# Patient Record
Sex: FEMALE | Race: WHITE | Employment: UNEMPLOYED | URBAN - METROPOLITAN AREA
[De-identification: names, ages, dates, MRNs, and addresses within clinical notes are randomized per-mention and may not be internally consistent; named-entity substitution may affect disease eponyms.]

---

## 2020-07-12 ENCOUNTER — APPOINTMENT (OUTPATIENT)
Dept: GENERAL RADIOLOGY | Age: 40
End: 2020-07-12
Attending: STUDENT IN AN ORGANIZED HEALTH CARE EDUCATION/TRAINING PROGRAM
Payer: MEDICAID

## 2020-07-12 ENCOUNTER — HOSPITAL ENCOUNTER (EMERGENCY)
Age: 40
Discharge: HOME | End: 2020-07-12
Attending: STUDENT IN AN ORGANIZED HEALTH CARE EDUCATION/TRAINING PROGRAM | Admitting: STUDENT IN AN ORGANIZED HEALTH CARE EDUCATION/TRAINING PROGRAM
Payer: MEDICAID

## 2020-07-12 ENCOUNTER — APPOINTMENT (OUTPATIENT)
Dept: CT IMAGING | Age: 40
End: 2020-07-12
Attending: STUDENT IN AN ORGANIZED HEALTH CARE EDUCATION/TRAINING PROGRAM
Payer: MEDICAID

## 2020-07-12 VITALS
DIASTOLIC BLOOD PRESSURE: 88 MMHG | HEART RATE: 99 BPM | SYSTOLIC BLOOD PRESSURE: 136 MMHG | RESPIRATION RATE: 16 BRPM | OXYGEN SATURATION: 97 % | TEMPERATURE: 98.2 F

## 2020-07-12 DIAGNOSIS — S09.90XA CLOSED HEAD INJURY, INITIAL ENCOUNTER: ICD-10-CM

## 2020-07-12 DIAGNOSIS — S49.92XA INJURY OF LEFT UPPER ARM, INITIAL ENCOUNTER: ICD-10-CM
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
## 2020-07-12 DIAGNOSIS — S60.212A CONTUSION OF LEFT WRIST, INITIAL ENCOUNTER: ICD-10-CM

## 2020-07-12 PROCEDURE — 73060 X-RAY EXAM OF HUMERUS: CPT

## 2020-07-12 PROCEDURE — 74011250637 HC RX REV CODE- 250/637: Performed by: STUDENT IN AN ORGANIZED HEALTH CARE EDUCATION/TRAINING PROGRAM

## 2020-07-12 PROCEDURE — 99284 EMERGENCY DEPT VISIT MOD MDM: CPT

## 2020-07-12 PROCEDURE — 99283 EMERGENCY DEPT VISIT LOW MDM: CPT

## 2020-07-12 PROCEDURE — 70450 CT HEAD/BRAIN W/O DYE: CPT

## 2020-07-12 PROCEDURE — 73090 X-RAY EXAM OF FOREARM: CPT

## 2020-07-12 RX ORDER — ACETAMINOPHEN 500 MG
1000 TABLET ORAL
Status: COMPLETED | OUTPATIENT
Start: 2020-07-12 | End: 2020-07-12

## 2020-07-12 RX ADMIN — ACETAMINOPHEN 1000 MG: 500 TABLET ORAL at 04:42
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
## 2020-07-12 NOTE — DISCHARGE INSTRUCTIONS
REACH OUT TO FORENSICS FOR RESOURCES AS NEEDED. DO NOT HESITATE TO RETURN IF NEEDED. RETURN IF WORSENING PAIN, CHANGE IN COLOR, CHANGE IN TEMPERATURE, OR LOSS OF SENSATION IN THE AFFECTED EXTREMITY    Seek immediate care for increased sleepiness, irritability, focal deficits (not moving an arm or leg, face looks different, numbness) and vomiting more than once.

## 2020-07-12 NOTE — FORENSIC NURSE
Patient declined forensics. FNE to complete [facility] referral. SBAR given to [facility], care of patient relinquished to RN.
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
## 2020-11-04 ENCOUNTER — HOSPITAL ENCOUNTER (EMERGENCY)
Age: 40
Discharge: HOME | End: 2020-11-04
Attending: EMERGENCY MEDICINE
Payer: COMMERCIAL

## 2020-11-04 ENCOUNTER — APPOINTMENT (OUTPATIENT)
Dept: GENERAL RADIOLOGY | Age: 40
End: 2020-11-04
Attending: EMERGENCY MEDICINE
Payer: COMMERCIAL

## 2020-11-04 VITALS
HEIGHT: 65 IN | BODY MASS INDEX: 31.65 KG/M2 | HEART RATE: 91 BPM | RESPIRATION RATE: 16 BRPM | DIASTOLIC BLOOD PRESSURE: 87 MMHG | WEIGHT: 190 LBS | SYSTOLIC BLOOD PRESSURE: 121 MMHG | OXYGEN SATURATION: 99 % | TEMPERATURE: 97.4 F
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
## 2020-11-04 DIAGNOSIS — S00.83XA CONTUSION OF FACE, INITIAL ENCOUNTER: ICD-10-CM

## 2020-11-04 PROCEDURE — 73080 X-RAY EXAM OF ELBOW: CPT

## 2020-11-04 PROCEDURE — 74011250637 HC RX REV CODE- 250/637: Performed by: EMERGENCY MEDICINE

## 2020-11-04 PROCEDURE — 75810000275 HC EMERGENCY DEPT VISIT NO LEVEL OF CARE

## 2020-11-04 PROCEDURE — 72072 X-RAY EXAM THORAC SPINE 3VWS: CPT

## 2020-11-04 PROCEDURE — 71046 X-RAY EXAM CHEST 2 VIEWS: CPT

## 2020-11-04 PROCEDURE — 99284 EMERGENCY DEPT VISIT MOD MDM: CPT

## 2020-11-04 PROCEDURE — 70160 X-RAY EXAM OF NASAL BONES: CPT

## 2020-11-04 RX ORDER — HYDROCODONE BITARTRATE AND ACETAMINOPHEN 5; 325 MG/1; MG/1
1 TABLET ORAL
Status: COMPLETED | OUTPATIENT
Start: 2020-11-04 | End: 2020-11-04

## 2020-11-04 RX ORDER — KETOROLAC TROMETHAMINE 10 MG/1
10 TABLET, FILM COATED ORAL
Qty: 20 TAB | Refills: 0 | Status: ON HOLD | OUTPATIENT
Start: 2020-11-04 | End: 2020-12-22

## 2020-11-04 RX ADMIN — HYDROCODONE BITARTRATE AND ACETAMINOPHEN 1 TABLET: 5; 325 TABLET ORAL at 10:56

## 2020-11-04 NOTE — DISCHARGE INSTRUCTIONS
Domestic Abuse: Care Instructions  Your Care Instructions     If you want to save this information but don't think it is safe to take it home, see if a trusted friend can keep it for you. Plan ahead. Know who you can call for help, and memorize the phone number. Be careful online too. Your online activity may be seen by others. Do not use your personal computer or device to read about this topic. Use a safe computer such as one at work, a friend's house, or a [employer]. Domestic abuse is different from an argument now and then. It is a pattern of abuse that one person uses to control another person's behavior. It may start with threats and name-calling. Then, it may lead to more serious acts, like pushing and slapping. The abuse also may occur in other areas. For example, the abuser may withhold money or spend a partner's money without his or her knowledge. Abuse can cause serious harm. You are more likely to have a long-term health problem from the injuries and stress of living in a violent relationship. Women who are sexually abused by their partners have more sexually transmitted diseases and unwanted pregnancies. Men also can be abused in relationships. Anyone who is abused also faces emotional pain. If you are pregnant, abuse can cause problems such as poor weight gain, infections, and bleeding. Abuse during this time may increase your baby's risk of low birth weight, premature birth, and death. Follow-up care is a key part of your treatment and safety. Be sure to make and go to all appointments, and call your doctor if you are having problems. It's also a good idea to know your test results and keep a list of the medicines you take. How can you care for yourself at home? · If you do not have a safe place to stay, discuss this with your doctor before you leave. · Have a plan for where to go, how to leave your house, and where to stay in case of an emergency. Do not tell your partner about your plan. Contact:  ? The [place] Violence Hotline toll-free at 4-218.920.1784. They can help you find resources in your area. ? Your local police department, hospital, or clinic for information about shelters and safe homes near you. · Talk to a trusted friend or neighbor or a Buddhism counselor. Do not feel that you have to hide what happened. · Teach your children how to call for help in an emergency. · Be alert to warning signs, such as threats, heavy alcohol use, or drug use. This can help you avoid danger. · If you can, make sure that there are no guns or other weapons in the house. When should you call for help? Call 911 anytime you think you may need emergency care. For example, call if:    · You or someone else has just been abused.     · You think you or someone else is in danger of being abused. Watch closely for changes in your health, and be sure to contact your doctor if you have any problems. Where can you learn more? Go to http://www.gray.com/  Enter G282 in the search box to learn more about \"Domestic Abuse: Care Instructions. \"  Current as of: December 16, 2019               Content Version: 12.6  © 7973-3851 [employer]. Care instructions adapted under license by [employer] (which disclaims liability or warranty for this information).  If you have questions about a medical condition or this instruction, always ask your healthcare professional. [place] [address] any warranty or liability for your use of this information.
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
## 2020-12-18 ENCOUNTER — APPOINTMENT (OUTPATIENT)
Dept: GENERAL RADIOLOGY | Age: 40
End: 2020-12-18
Attending: EMERGENCY MEDICINE
Payer: COMMERCIAL

## 2020-12-18 ENCOUNTER — HOSPITAL ENCOUNTER (EMERGENCY)
Age: 40
Discharge: HOME | End: 2020-12-18
Attending: EMERGENCY MEDICINE
Payer: COMMERCIAL

## 2020-12-18 VITALS
RESPIRATION RATE: 22 BRPM | SYSTOLIC BLOOD PRESSURE: 153 MMHG | BODY MASS INDEX: 29.38 KG/M2 | OXYGEN SATURATION: 98 % | DIASTOLIC BLOOD PRESSURE: 94 MMHG | TEMPERATURE: 98.9 F | WEIGHT: 176.37 LBS | HEART RATE: 103 BPM | HEIGHT: 65 IN

## 2020-12-18 DIAGNOSIS — R05.8 COUGH WITH EXPOSURE TO COVID-19 VIRUS: ICD-10-CM

## 2020-12-18 DIAGNOSIS — B37.31 YEAST VAGINITIS: Primary | ICD-10-CM

## 2020-12-18 DIAGNOSIS — Z20.822 COUGH WITH EXPOSURE TO COVID-19 VIRUS: ICD-10-CM

## 2020-12-18 LAB
APPEARANCE UR: ABNORMAL
BACTERIA URNS QL MICRO: NEGATIVE /HPF
BILIRUB UR QL: NEGATIVE
COLOR UR: ABNORMAL
EPITH CASTS URNS QL MICRO: ABNORMAL /LPF
GLUCOSE UR STRIP.AUTO-MCNC: NEGATIVE MG/DL
HGB UR QL STRIP: NEGATIVE
KETONES UR QL STRIP.AUTO: 15 MG/DL
LEUKOCYTE ESTERASE UR QL STRIP.AUTO: ABNORMAL
MUCOUS THREADS URNS QL MICRO: ABNORMAL /LPF
NITRITE UR QL STRIP.AUTO: NEGATIVE
PH UR STRIP: 5.5 [PH] (ref 5–8)
PROT UR STRIP-MCNC: NEGATIVE MG/DL
RBC #/AREA URNS HPF: ABNORMAL /HPF (ref 0–5)
SP GR UR REFRACTOMETRY: 1.02 (ref 1–1.03)
UROBILINOGEN UR QL STRIP.AUTO: 0.2 EU/DL (ref 0.2–1)
WBC URNS QL MICRO: ABNORMAL /HPF (ref 0–4)
YEAST URNS QL MICRO: PRESENT

## 2020-12-18 PROCEDURE — 87086 URINE CULTURE/COLONY COUNT: CPT

## 2020-12-18 PROCEDURE — 99284 EMERGENCY DEPT VISIT MOD MDM: CPT

## 2020-12-18 PROCEDURE — 87635 SARS-COV-2 COVID-19 AMP PRB: CPT

## 2020-12-18 PROCEDURE — 81001 URINALYSIS AUTO W/SCOPE: CPT

## 2020-12-18 PROCEDURE — 71045 X-RAY EXAM CHEST 1 VIEW: CPT

## 2020-12-18 PROCEDURE — 74011250637 HC RX REV CODE- 250/637: Performed by: EMERGENCY MEDICINE

## 2020-12-18 RX ORDER — AZITHROMYCIN 250 MG/1
TABLET, FILM COATED ORAL
Qty: 6 TAB | Refills: 0 | Status: SHIPPED | OUTPATIENT
Start: 2020-12-18 | End: 2020-12-28

## 2020-12-18 RX ORDER — BENZONATATE 100 MG/1
100 CAPSULE ORAL
Qty: 30 CAP | Refills: 0 | Status: SHIPPED | OUTPATIENT
Start: 2020-12-18 | End: 2020-12-28

## 2020-12-18 RX ORDER — PREDNISONE 20 MG/1
40 TABLET ORAL DAILY
Qty: 10 TAB | Refills: 0 | Status: SHIPPED | OUTPATIENT
Start: 2020-12-18 | End: 2020-12-28

## 2020-12-18 RX ORDER — FAMOTIDINE 20 MG/1
20 TABLET, FILM COATED ORAL
Status: COMPLETED | OUTPATIENT
Start: 2020-12-18 | End: 2020-12-18

## 2020-12-18 RX ORDER — DEXAMETHASONE SODIUM PHOSPHATE 10 MG/ML
10 INJECTION INTRAMUSCULAR; INTRAVENOUS
Status: COMPLETED | OUTPATIENT
Start: 2020-12-18 | End: 2020-12-18

## 2020-12-18 RX ORDER — CETIRIZINE HCL 10 MG
10 TABLET ORAL
Status: COMPLETED | OUTPATIENT
Start: 2020-12-18 | End: 2020-12-18

## 2020-12-18 RX ORDER — FLUCONAZOLE 100 MG/1
200 TABLET ORAL
Status: COMPLETED | OUTPATIENT
Start: 2020-12-18 | End: 2020-12-18

## 2020-12-18 RX ADMIN — CETIRIZINE HYDROCHLORIDE 10 MG: 10 TABLET, FILM COATED ORAL at 15:42

## 2020-12-18 RX ADMIN — DEXAMETHASONE SODIUM PHOSPHATE 10 MG: 10 INJECTION, SOLUTION INTRAMUSCULAR; INTRAVENOUS at 15:42

## 2020-12-18 RX ADMIN — FAMOTIDINE 20 MG: 20 TABLET ORAL at 15:42

## 2020-12-18 RX ADMIN — FLUCONAZOLE 200 MG: 100 TABLET ORAL at 15:42

## 2020-12-18 NOTE — ED PROVIDER NOTES
HPI patient is a 20-year-old female with past medical history significant for frequent urinary tract infections and multiple ED visits. She states that she is presently homeless and living with her parents. She completed 7 days of Keflex for treatment of urinary tract infection. She states she still has mild urgency and dysuria accompanied by body aches and intermittent fever. She denies any known exposure to Covid. Denies headache, neck pain, visual changes, focal weakness or rash. Denies any difficulty breathing, difficulty swallowing, SOB or chest pain. Denies any nausea, vomiting or diarrhea. Pt. Reports that she has not had any medications today prior to arrival.  She has a prescription for Bentyl which she has using as needed for abdominal pain with some relief noted. History reviewed. No pertinent past medical history. History reviewed. No pertinent surgical history. History reviewed. No pertinent family history.     Social History     Socioeconomic History    Marital status: SINGLE     Spouse name: Not on file    Number of children: Not on file    Years of education: Not on file    Highest education level: Not on file   Occupational History    Not on file   Social Needs    Financial resource strain: Not on file    Food insecurity     Worry: Not on file     Inability: Not on file    Transportation needs     Medical: Not on file     Non-medical: Not on file   Tobacco Use    Smoking status: Current Every Day Smoker    Smokeless tobacco: Never Used   Substance and Sexual Activity    Alcohol use: No    Drug use: Not on file    Sexual activity: Not on file   Lifestyle    Physical activity     Days per week: Not on file     Minutes per session: Not on file    Stress: Not on file   Relationships    Social connections     Talks on phone: Not on file     Gets together: Not on file     Attends Voodoo service: Not on file     Active member of club or organization: Not on file Attends meetings of clubs or organizations: Not on file     Relationship status: Not on file    Intimate partner violence     Fear of current or ex partner: Not on file     Emotionally abused: Not on file     Physically abused: Not on file     Forced sexual activity: Not on file   Other Topics Concern    Not on file   Social History Narrative    Not on file         ALLERGIES: Contrast agent [iodine]    Review of Systems   Constitutional: Positive for fever. Negative for activity change, appetite change and unexpected weight change. HENT: Positive for rhinorrhea and sore throat. Negative for congestion and trouble swallowing. Respiratory: Negative for cough and shortness of breath. Gastrointestinal: Positive for abdominal pain. Negative for constipation, diarrhea, nausea and vomiting. Genitourinary: Positive for dysuria. Negative for difficulty urinating and flank pain. Musculoskeletal: Negative for arthralgias, back pain and myalgias. Neurological: Negative for dizziness, light-headedness and headaches. All other systems reviewed and are negative. Vitals:    12/18/20 1359   BP: (!) 153/94   Pulse: (!) 103   Resp: 22   Temp: 98.9 °F (37.2 °C)   SpO2: 98%   Weight: 80 kg (176 lb 5.9 oz)   Height: 5' 4.5\" (1.638 m)            Physical Exam  Vitals signs and nursing note reviewed. Constitutional:       General: She is not in acute distress. Appearance: Normal appearance. She is normal weight. She is not ill-appearing, toxic-appearing or diaphoretic. Comments: Female; non smoker   HENT:      Head: Normocephalic. Neck:      Musculoskeletal: Normal range of motion and neck supple. Cardiovascular:      Rate and Rhythm: Normal rate and regular rhythm. Pulmonary:      Effort: Pulmonary effort is normal.      Breath sounds: Normal breath sounds. Chest:      Chest wall: No tenderness. Abdominal:      General: Bowel sounds are normal.      Palpations: Abdomen is soft.       Tenderness: There is no abdominal tenderness. There is no guarding or rebound. Hernia: No hernia is present. Musculoskeletal: Normal range of motion. Lymphadenopathy:      Cervical: No cervical adenopathy. Skin:     General: Skin is warm and dry. Findings: No rash. Neurological:      General: No focal deficit present. Mental Status: She is alert and oriented to person, place, and time. Psychiatric:         Mood and Affect: Mood normal.         Behavior: Behavior normal.          MDM       Procedures      Fern Saldana was evaluated in the Emergency Department on 12/18/2020 for the symptoms described in the history of present illness. He/she was evaluated in the context of the global COVID-19 pandemic, which necessitated consideration that the patient might be at risk for infection with the SARS-CoV-2 virus that causes COVID-19. Institutional protocols and algorithms that pertain to the evaluation of patients at risk for COVID-19 are in a state of rapid change based on information released by regulatory bodies including the CDC and federal and state organizations. These policies and algorithms were followed during the patient's care in the ED. Surrogate Decision Maker (Who do you want to make decisions for you in the event you are not able to?): Extended Emergency Contact Information  Primary Emergency Contact: Oneida Lancaster  Mobile Phone: [phone]  Relation: Other Non-relative    Ventilation (Do you want to be intubated and mechanically ventilated?):  Yes    CPR (Do you want chest compressions and electricity in an attempt to restart your heart?): Yes      Xr Chest Port    Result Date: 12/18/2020  IMPRESSION: No acute process.      Labs Reviewed   URINALYSIS W/ RFLX MICROSCOPIC - Abnormal; Notable for the following components:       Result Value    Appearance CLOUDY (*)     Ketone 15 (*)     Leukocyte Esterase SMALL (*)     Epithelial cells MODERATE (*)     Mucus 3+ (*)     Yeast w/hyphae PRESENT (*)     All other components within normal limits   CULTURE, URINE   SARS-COV-2   *UA&MICRO CHARGE BAT     Patient was treated with Diflucan in the ED for yeast vaginitis. Recommend close follow-up with OB/GYN for further evaluation and treatment. Reviewed quarantine recommendations with the patient and supportive care. Plan to discharge on Z-Rashi, 5 days of prednisone and Tessalon Perles. Recommend close follow-up with PCP if symptoms persist.    4:27 PM  Patient's results and plan of care have been reviewed with her. Patient has verbally conveyed her understanding and agreement of her signs, symptoms, diagnosis, treatment and prognosis and additionally agrees to follow up as recommended or return to the Emergency Room should her condition change prior to follow-up. Discharge instructions have also been provided to the patient with some educational information regarding her diagnosis as well a list of reasons why she would want to return to the ER prior to her follow-up appointment should her condition change. Nicky Elam NP

## 2020-12-18 NOTE — ED NOTES
Triage Note: Patient is coming in for abdominal pain and recent UTI that she has finished antibiotic. Patient is scheduled for D&C for 1/7/2021. In the last 24 hours patient has started with body aches and fever.

## 2020-12-18 NOTE — DISCHARGE INSTRUCTIONS
Patient Education   Learning About Coronavirus [phone])  Coronavirus [phone]): Overview  What is coronavirus ([id number]-56)? The coronavirus disease (COVID-19) is caused by a virus. It is an illness that was first found in [place], [place], in December 2019. It has since spread worldwide. The virus can cause fever, cough, and trouble breathing. In severe cases, it can cause pneumonia and make it hard to breathe without help. It can cause death. Coronaviruses are a large group of viruses. They cause the common cold. They also cause more serious illnesses like Middle East respiratory syndrome (MERS) and severe acute respiratory syndrome (SARS). COVID-19 is caused by a novel coronavirus. That means it's a new type that has not been seen in people before. This virus spreads person-to-person through droplets from coughing and sneezing. It can also spread when you are close to someone who is infected. And it can spread when you touch something that has the virus on it, such as a doorknob or a tabletop. What can you do to protect yourself from coronavirus (COVID-19)? The best way to protect yourself from getting sick is to:  · Avoid areas where there is an outbreak. · Avoid contact with people who may be infected. · Wash your hands often with soap or alcohol-based hand sanitizers. · Avoid crowds and try to stay at least 6 feet away from other people. · Wash your hands often, especially after you cough or sneeze. Use soap and water, and scrub for at least 20 seconds. If soap and water aren't available, use an alcohol-based hand . · Avoid touching your mouth, nose, and eyes. What can you do to avoid spreading the virus to others? To help avoid spreading the virus to others:  · Cover your mouth with a tissue when you cough or sneeze. Then throw the tissue in the trash. · Use a disinfectant to clean things that you touch often. · Stay home if you are sick or have been exposed to the virus.  Don't go to school, work, or public areas. And don't use public transportation. · If you are sick:  ? Leave your home only if you need to get medical care. But call the doctor's office first so they know you're coming. And wear a face mask, if you have one.  ? If you have a face mask, wear it whenever you're around other people. It can help stop the spread of the virus when you cough or sneeze. ? Clean and disinfect your home every day. Use household  and disinfectant wipes or sprays. Take special care to clean things that you grab with your hands. These include doorknobs, remote controls, phones, and handles on your refrigerator and microwave. And don't forget countertops, tabletops, bathrooms, and computer keyboards. When to call for help  Call 911 anytime you think you may need emergency care. For example, call if:  · You have severe trouble breathing. (You can't talk at all.)  · You have constant chest pain or pressure. · You are severely dizzy or lightheaded. · You are confused or can't think clearly. · Your face and lips have a blue color. · You pass out (lose consciousness) or are very hard to wake up. Call your doctor now if you develop symptoms such as:  · Shortness of breath. · Fever. · Cough. If you need to get care, call ahead to the doctor's office for instructions before you go. Make sure you wear a face mask, if you have one, to prevent exposing other people to the virus. Where can you get the latest information? The following health organizations are tracking and studying this virus. Their websites contain the most up-to-date information. [place] Lopez also learn what to do if you think you may have been exposed to the virus. · U.S. Centers for Disease Control and Prevention (CDC): The CDC provides updated news about the disease and travel advice. The website also tells you how to prevent the spread of infection.  www.cdc.gov  · World Health Organization [facility]): WHO offers information about the virus outbreaks. WHO also has travel advice. www.who.int  Current as of: April 1, 2020               Content Version: 12.4  © 2006-2020 Healthwise, Incorporated. Care instructions adapted under license by your healthcare professional. If you have questions about a medical condition or this instruction, always ask your healthcare professional. [address] any warranty or liability for your use of this information. Patient Education        Cough: Care Instructions  Your Care Instructions     A cough is your body's response to something that bothers your throat or airways. Many things can cause a cough. You might cough because of a cold or the flu, bronchitis, or asthma. Smoking, postnasal drip, allergies, and stomach acid that backs up into your throat also can cause coughs. A cough is a symptom, not a disease. Most coughs stop when the cause, such as a cold, goes away. You can take a few steps at home to cough less and feel better. Follow-up care is a key part of your treatment and safety. Be sure to make and go to all appointments, and call your doctor if you are having problems. It's also a good idea to know your test results and keep a list of the medicines you take. How can you care for yourself at home? · Drink lots of water and other fluids. This helps thin the mucus and soothes a dry or sore throat. Honey or lemon juice in hot water or tea may ease a dry cough. · Take cough medicine as directed by your doctor. · Prop up your head on pillows to help you breathe and ease a dry cough. · Try cough drops to soothe a dry or sore throat. Cough drops don't stop a cough. Medicine-flavored cough drops are no better than candy-flavored drops or hard candy. · Do not smoke. Avoid secondhand smoke. If you need help quitting, talk to your doctor about stop-smoking programs and medicines. These can increase your chances of quitting for good. When should you call for help?    Call [phone] anytime you think you may need emergency care. For example, call if:    · You have severe trouble breathing. Call your doctor now or seek immediate medical care if:    · You cough up blood.     · You have new or worse trouble breathing.     · You have a new or higher fever.     · You have a new rash. Watch closely for changes in your health, and be sure to contact your doctor if:    · You cough more deeply or more often, especially if you notice more mucus or a change in the color of your mucus.     · You have new symptoms, such as a sore throat, an earache, or sinus pain.     · You do not get better as expected. Where can you learn more? Go to http://www.gray.com/  Enter D279 in the search box to learn more about \"Cough: Care Instructions. \"  Current as of: February 24, 2020               Content Version: 12.6  © 0035-9520 [employer]. Care instructions adapted under license by [employer] (which disclaims liability or warranty for this information). If you have questions about a medical condition or this instruction, always ask your healthcare professional. [address] any warranty or liability for your use of this information. Patient Education        Vaginal Yeast Infection: Care Instructions  Your Care Instructions     A vaginal yeast infection is caused by too many yeast cells in the vagina. This is common in women of all ages. Itching, vaginal discharge and irritation, and other symptoms can bother you. But yeast infections don't often cause other health problems. Some medicines can increase your risk of getting a yeast infection. These include antibiotics, birth control pills, hormones, and steroids. You may also be more likely to get a yeast infection if you are pregnant, have diabetes, douche, or wear tight clothes. With treatment, most yeast infections get better in 2 to 3 days.   Follow-up care is a key part of your treatment and safety. Be sure to make and go to all appointments, and call your doctor if you are having problems. It's also a good idea to know your test results and keep a list of the medicines you take. How can you care for yourself at home? · Take your medicines exactly as prescribed. Call your doctor if you think you are having a problem with your medicine. · Ask your doctor about over-the-counter (OTC) medicines for yeast infections. They may cost less than prescription medicines. If you use an OTC treatment, read and follow all instructions on the label. · Do not use tampons while using a vaginal cream or suppository. The tampons can absorb the medicine. Use pads instead. · Wear loose cotton clothing. Do not wear nylon or other fabric that holds body heat and moisture close to the skin. · Try sleeping without underwear. · Do not scratch. Relieve itching with a cold pack or a cool bath. · Do not wash your vaginal area more than once a day. Use plain water or a mild, unscented soap. Air-dry the vaginal area. · Change out of wet swimsuits after swimming. · Do not have sex until you have finished your treatment. · Do not douche. When should you call for help? Call your doctor now or seek immediate medical care if:    · You have unexpected vaginal bleeding.     · You have new or increased pain in your vagina or pelvis. Watch closely for changes in your health, and be sure to contact your doctor if:    · You have a fever.     · You are not getting better after 2 days.     · Your symptoms come back after you finish your medicines. Where can you learn more? Go to http://www.gray.com/  Enter K587 in the search box to learn more about \"Vaginal Yeast Infection: Care Instructions. \"  Current as of: November 8, 2019               Content Version: 12.6  © 4002-0386 [employer].    Care instructions adapted under license by [employer] (which disclaims liability or warranty for this information). If you have questions about a medical condition or this instruction, always ask your healthcare professional. [address] any warranty or liability for your use of this information. Patient Education        Viral Infections: Care Instructions  Your Care Instructions     You don't feel well, but it's not clear what's causing it. You may have a viral infection. Viruses cause many illnesses, such as the common cold, influenza, fever, rashes, and the diarrhea, nausea, and vomiting that are often called \"stomach flu. \" You may wonder if antibiotic medicines could make you feel better. But antibiotics only treat infections caused by bacteria. They don't work on viruses. The good news is that viral infections usually aren't serious. Most will go away in a few days without medical treatment. In the meantime, there are a few things you can do to make yourself more comfortable. Follow-up care is a key part of your treatment and safety. Be sure to make and go to all appointments, and call your doctor if you are having problems. It's also a good idea to know your test results and keep a list of the medicines you take. How can you care for yourself at home? · Get plenty of rest if you feel tired. · Take an over-the-counter pain medicine if needed, such as acetaminophen (Tylenol), ibuprofen (Advil, Motrin), or naproxen (Aleve). Read and follow all instructions on the label. · Be careful when taking over-the-counter cold or flu medicines and Tylenol at the same time. Many of these medicines have acetaminophen, which is Tylenol. Read the labels to make sure that you are not taking more than the recommended dose. Too much acetaminophen (Tylenol) can be harmful. · Drink plenty of fluids, enough so that your urine is light yellow or clear like water.  If you have kidney, heart, or liver disease and have to limit fluids, talk with your doctor before you increase the amount of fluids you drink. · Stay home from work, school, and other public places while you have a fever. When should you call for help? Call 911 anytime you think you may need emergency care. For example, call if:    · You have severe trouble breathing.     · You passed out (lost consciousness). Call your doctor now or seek immediate medical care if:    · You seem to be getting much sicker.     · You have a new or higher fever.     · You have blood in your stools.     · You have new belly pain, or your pain gets worse.     · You have a new rash. Watch closely for changes in your health, and be sure to contact your doctor if:    · You start to get better and then get worse.     · You do not get better as expected. Where can you learn more? Go to http://www.gray.com/  Enter L906 in the search box to learn more about \"Viral Infections: Care Instructions. \"  Current as of: February 11, 2020               Content Version: 12.6  © 2006-2020 [employer]. Care instructions adapted under license by [employer] (which disclaims liability or warranty for this information). If you have questions about a medical condition or this instruction, always ask your healthcare professional. [address] any warranty or liability for your use of this information.

## 2020-12-19 LAB
BACTERIA SPEC CULT: NORMAL
CC UR VC: NORMAL
SERVICE CMNT-IMP: NORMAL

## 2020-12-21 ENCOUNTER — HOSPITAL ENCOUNTER (EMERGENCY)
Age: 40
Discharge: HOME | DRG: 754 | End: 2020-12-21
Attending: EMERGENCY MEDICINE
Payer: COMMERCIAL

## 2020-12-21 ENCOUNTER — HOSPITAL ENCOUNTER (INPATIENT)
Age: 40
LOS: 6 days | Discharge: HOME | DRG: 754 | End: 2020-12-28
Attending: EMERGENCY MEDICINE | Admitting: PSYCHIATRY & NEUROLOGY
Payer: COMMERCIAL

## 2020-12-21 ENCOUNTER — APPOINTMENT (OUTPATIENT)
Dept: CT IMAGING | Age: 40
DRG: 754 | End: 2020-12-21
Attending: EMERGENCY MEDICINE
Payer: COMMERCIAL

## 2020-12-21 VITALS
HEART RATE: 77 BPM | BODY MASS INDEX: 28.25 KG/M2 | WEIGHT: 169.53 LBS | TEMPERATURE: 99.2 F | DIASTOLIC BLOOD PRESSURE: 86 MMHG | OXYGEN SATURATION: 99 % | HEIGHT: 65 IN | SYSTOLIC BLOOD PRESSURE: 118 MMHG | RESPIRATION RATE: 16 BRPM

## 2020-12-21 DIAGNOSIS — N32.89 BLADDER SPASM: ICD-10-CM

## 2020-12-21 DIAGNOSIS — R10.84 ABDOMINAL PAIN, GENERALIZED: Primary | ICD-10-CM

## 2020-12-21 DIAGNOSIS — F29 PSYCHOSIS, UNSPECIFIED PSYCHOSIS TYPE (HCC): Primary | ICD-10-CM

## 2020-12-21 LAB
ALBUMIN SERPL-MCNC: 4.2 G/DL (ref 3.5–5)
ALBUMIN SERPL-MCNC: 5 G/DL (ref 3.5–5)
ALBUMIN/GLOB SERPL: 1.1 {RATIO} (ref 1.1–2.2)
ALBUMIN/GLOB SERPL: 1.4 {RATIO} (ref 1.1–2.2)
ALP SERPL-CCNC: 33 U/L (ref 45–117)
ALP SERPL-CCNC: 36 U/L (ref 45–117)
ALT SERPL-CCNC: 13 U/L (ref 12–78)
ALT SERPL-CCNC: 15 U/L (ref 12–78)
AMPHET UR QL SCN: POSITIVE
ANION GAP SERPL CALC-SCNC: 11 MMOL/L (ref 5–15)
ANION GAP SERPL CALC-SCNC: 13 MMOL/L (ref 5–15)
APAP SERPL-MCNC: <2 UG/ML (ref 10–30)
APPEARANCE UR: ABNORMAL
AST SERPL-CCNC: 12 U/L (ref 15–37)
AST SERPL-CCNC: 13 U/L (ref 15–37)
BACTERIA URNS QL MICRO: ABNORMAL /HPF
BARBITURATES UR QL SCN: NEGATIVE
BASOPHILS # BLD: 0.1 K/UL (ref 0–0.1)
BASOPHILS # BLD: 0.1 K/UL (ref 0–0.1)
BASOPHILS NFR BLD: 0 % (ref 0–1)
BASOPHILS NFR BLD: 1 % (ref 0–1)
BENZODIAZ UR QL: NEGATIVE
BILIRUB DIRECT SERPL-MCNC: 0.2 MG/DL (ref 0–0.2)
BILIRUB SERPL-MCNC: 0.8 MG/DL (ref 0.2–1)
BILIRUB SERPL-MCNC: 0.8 MG/DL (ref 0.2–1)
BILIRUB UR QL: NEGATIVE
BUN SERPL-MCNC: 13 MG/DL (ref 6–20)
BUN SERPL-MCNC: 15 MG/DL (ref 6–20)
BUN/CREAT SERPL: 15 (ref 12–20)
BUN/CREAT SERPL: 21 (ref 12–20)
CALCIUM SERPL-MCNC: 9.5 MG/DL (ref 8.5–10.1)
CALCIUM SERPL-MCNC: 9.7 MG/DL (ref 8.5–10.1)
CANNABINOIDS UR QL SCN: POSITIVE
CHLORIDE SERPL-SCNC: 101 MMOL/L (ref 97–108)
CHLORIDE SERPL-SCNC: 99 MMOL/L (ref 97–108)
CO2 SERPL-SCNC: 25 MMOL/L (ref 21–32)
CO2 SERPL-SCNC: 27 MMOL/L (ref 21–32)
COCAINE UR QL SCN: NEGATIVE
COLOR UR: ABNORMAL
COMMENT, HOLDF: NORMAL
COVID-19, XGCOVT: NOT DETECTED
CREAT SERPL-MCNC: 0.7 MG/DL (ref 0.55–1.02)
CREAT SERPL-MCNC: 0.85 MG/DL (ref 0.55–1.02)
DIFFERENTIAL METHOD BLD: ABNORMAL
DIFFERENTIAL METHOD BLD: ABNORMAL
DRUG SCRN COMMENT,DRGCM: ABNORMAL
EOSINOPHIL # BLD: 0.2 K/UL (ref 0–0.4)
EOSINOPHIL # BLD: 0.2 K/UL (ref 0–0.4)
EOSINOPHIL NFR BLD: 1 % (ref 0–7)
EOSINOPHIL NFR BLD: 2 % (ref 0–7)
EPITH CASTS URNS QL MICRO: ABNORMAL /LPF
ERYTHROCYTE [DISTWIDTH] IN BLOOD BY AUTOMATED COUNT: 13.4 % (ref 11.5–14.5)
ERYTHROCYTE [DISTWIDTH] IN BLOOD BY AUTOMATED COUNT: 13.7 % (ref 11.5–14.5)
ETHANOL SERPL-MCNC: <10 MG/DL
ETHANOL SERPL-MCNC: <10 MG/DL
GLOBULIN SER CALC-MCNC: 3.6 G/DL (ref 2–4)
GLOBULIN SER CALC-MCNC: 3.7 G/DL (ref 2–4)
GLUCOSE SERPL-MCNC: 83 MG/DL (ref 65–100)
GLUCOSE SERPL-MCNC: 96 MG/DL (ref 65–100)
GLUCOSE UR STRIP.AUTO-MCNC: NEGATIVE MG/DL
HCG UR QL: NEGATIVE
HCT VFR BLD AUTO: 45.5 % (ref 35–47)
HCT VFR BLD AUTO: 47.7 % (ref 35–47)
HEALTH STATUS, XMCV2T: NORMAL
HGB BLD-MCNC: 14.9 G/DL (ref 11.5–16)
HGB BLD-MCNC: 15.4 G/DL (ref 11.5–16)
HGB UR QL STRIP: ABNORMAL
IMM GRANULOCYTES # BLD AUTO: 0 K/UL (ref 0–0.04)
IMM GRANULOCYTES # BLD AUTO: 0.1 K/UL (ref 0–0.04)
IMM GRANULOCYTES NFR BLD AUTO: 0 % (ref 0–0.5)
IMM GRANULOCYTES NFR BLD AUTO: 0 % (ref 0–0.5)
KETONES UR QL STRIP.AUTO: NEGATIVE MG/DL
LEUKOCYTE ESTERASE UR QL STRIP.AUTO: ABNORMAL
LIPASE SERPL-CCNC: 191 U/L (ref 73–393)
LYMPHOCYTES # BLD: 5.2 K/UL (ref 0.8–3.5)
LYMPHOCYTES # BLD: 5.2 K/UL (ref 0.8–3.5)
LYMPHOCYTES NFR BLD: 38 % (ref 12–49)
LYMPHOCYTES NFR BLD: 39 % (ref 12–49)
MCH RBC QN AUTO: 28.9 PG (ref 26–34)
MCH RBC QN AUTO: 29 PG (ref 26–34)
MCHC RBC AUTO-ENTMCNC: 32.3 G/DL (ref 30–36.5)
MCHC RBC AUTO-ENTMCNC: 32.7 G/DL (ref 30–36.5)
MCV RBC AUTO: 88.7 FL (ref 80–99)
MCV RBC AUTO: 89.7 FL (ref 80–99)
METHADONE UR QL: NEGATIVE
MONOCYTES # BLD: 1.3 K/UL (ref 0–1)
MONOCYTES # BLD: 1.5 K/UL (ref 0–1)
MONOCYTES NFR BLD: 10 % (ref 5–13)
MONOCYTES NFR BLD: 11 % (ref 5–13)
NEUTS SEG # BLD: 6.2 K/UL (ref 1.8–8)
NEUTS SEG # BLD: 6.9 K/UL (ref 1.8–8)
NEUTS SEG NFR BLD: 47 % (ref 32–75)
NEUTS SEG NFR BLD: 51 % (ref 32–75)
NITRITE UR QL STRIP.AUTO: NEGATIVE
NRBC # BLD: 0 K/UL (ref 0–0.01)
NRBC # BLD: 0 K/UL (ref 0–0.01)
NRBC BLD-RTO: 0 PER 100 WBC
NRBC BLD-RTO: 0 PER 100 WBC
OPIATES UR QL: NEGATIVE
PCP UR QL: NEGATIVE
PH UR STRIP: 6 [PH] (ref 5–8)
PLATELET # BLD AUTO: 387 K/UL (ref 150–400)
PLATELET # BLD AUTO: 414 K/UL (ref 150–400)
PMV BLD AUTO: 9.4 FL (ref 8.9–12.9)
PMV BLD AUTO: 9.7 FL (ref 8.9–12.9)
POTASSIUM SERPL-SCNC: 3.3 MMOL/L (ref 3.5–5.1)
POTASSIUM SERPL-SCNC: 3.9 MMOL/L (ref 3.5–5.1)
PROT SERPL-MCNC: 7.9 G/DL (ref 6.4–8.2)
PROT SERPL-MCNC: 8.6 G/DL (ref 6.4–8.2)
PROT UR STRIP-MCNC: NEGATIVE MG/DL
RBC # BLD AUTO: 5.13 M/UL (ref 3.8–5.2)
RBC # BLD AUTO: 5.32 M/UL (ref 3.8–5.2)
RBC #/AREA URNS HPF: ABNORMAL /HPF (ref 0–5)
SALICYLATES SERPL-MCNC: 4.2 MG/DL (ref 2.8–20)
SAMPLES BEING HELD,HOLD: NORMAL
SODIUM SERPL-SCNC: 137 MMOL/L (ref 136–145)
SODIUM SERPL-SCNC: 139 MMOL/L (ref 136–145)
SOURCE, COVRS: NORMAL
SP GR UR REFRACTOMETRY: 1.01 (ref 1–1.03)
SPECIMEN SOURCE, FCOV2M: NORMAL
SPECIMEN TYPE, XMCV1T: NORMAL
TROPONIN I SERPL-MCNC: <0.05 NG/ML
UR CULT HOLD, URHOLD: NORMAL
UROBILINOGEN UR QL STRIP.AUTO: 0.2 EU/DL (ref 0.2–1)
WBC # BLD AUTO: 13.3 K/UL (ref 3.6–11)
WBC # BLD AUTO: 13.7 K/UL (ref 3.6–11)
WBC URNS QL MICRO: ABNORMAL /HPF (ref 0–4)

## 2020-12-21 PROCEDURE — 81001 URINALYSIS AUTO W/SCOPE: CPT

## 2020-12-21 PROCEDURE — 81025 URINE PREGNANCY TEST: CPT

## 2020-12-21 PROCEDURE — 99285 EMERGENCY DEPT VISIT HI MDM: CPT

## 2020-12-21 PROCEDURE — 36415 COLL VENOUS BLD VENIPUNCTURE: CPT

## 2020-12-21 PROCEDURE — 80076 HEPATIC FUNCTION PANEL: CPT

## 2020-12-21 PROCEDURE — 85025 COMPLETE CBC W/AUTO DIFF WBC: CPT

## 2020-12-21 PROCEDURE — 84484 ASSAY OF TROPONIN QUANT: CPT

## 2020-12-21 PROCEDURE — 83690 ASSAY OF LIPASE: CPT

## 2020-12-21 PROCEDURE — 74176 CT ABD & PELVIS W/O CONTRAST: CPT

## 2020-12-21 PROCEDURE — 80307 DRUG TEST PRSMV CHEM ANLYZR: CPT

## 2020-12-21 PROCEDURE — 74011250637 HC RX REV CODE- 250/637: Performed by: EMERGENCY MEDICINE

## 2020-12-21 PROCEDURE — 80053 COMPREHEN METABOLIC PANEL: CPT

## 2020-12-21 RX ORDER — LORAZEPAM 1 MG/1
1 TABLET ORAL
COMMUNITY
End: 2020-12-28

## 2020-12-21 RX ORDER — PHENAZOPYRIDINE HYDROCHLORIDE 200 MG/1
200 TABLET, FILM COATED ORAL 3 TIMES DAILY
Qty: 6 TAB | Refills: 0 | Status: SHIPPED | OUTPATIENT
Start: 2020-12-21 | End: 2020-12-28

## 2020-12-21 RX ORDER — DICYCLOMINE HYDROCHLORIDE 10 MG/1
20 CAPSULE ORAL
Status: COMPLETED | OUTPATIENT
Start: 2020-12-21 | End: 2020-12-21

## 2020-12-21 RX ADMIN — DICYCLOMINE HYDROCHLORIDE 20 MG: 10 CAPSULE ORAL at 16:27

## 2020-12-21 NOTE — ED TRIAGE NOTES
TRIAGE NOTE: Patient arrived from home with c/o generalized abdominal pain for the past week. Patient was seen at [facility] a couple days ago. Patient appears to be drowsy in triage.

## 2020-12-21 NOTE — ED PROVIDER NOTES
The history is provided by the patient. No  was used. Abdominal Pain   This is a recurrent problem. The current episode started more than 1 week ago. The problem occurs daily. The problem has not changed since onset. The pain is located in the generalized abdominal region. The quality of the pain is dull and cramping. The pain is moderate. Associated symptoms include anorexia. Pertinent negatives include no fever, no belching, no diarrhea, no flatus, no hematochezia, no melena, no nausea, no vomiting, no constipation, no dysuria, no frequency, no hematuria, no headaches, no arthralgias, no myalgias, no trauma, no chest pain and no back pain. Nothing worsens the pain. The pain is relieved by nothing. Past workup includes CT scan. Past workup includes no surgery, no colonoscopy. Her past medical history is significant for UTI. The patient's surgical history non-contributory. History reviewed. No pertinent past medical history. History reviewed. No pertinent surgical history. History reviewed. No pertinent family history.     Social History     Socioeconomic History    Marital status: SINGLE     Spouse name: Not on file    Number of children: Not on file    Years of education: Not on file    Highest education level: Not on file   Occupational History    Not on file   Social Needs    Financial resource strain: Not on file    Food insecurity     Worry: Not on file     Inability: Not on file    Transportation needs     Medical: Not on file     Non-medical: Not on file   Tobacco Use    Smoking status: Current Every Day Smoker    Smokeless tobacco: Never Used   Substance and Sexual Activity    Alcohol use: No    Drug use: Yes     Types: Marijuana    Sexual activity: Not on file   Lifestyle    Physical activity     Days per week: Not on file     Minutes per session: Not on file    Stress: Not on file   Relationships    Social connections     Talks on phone: Not on file Gets together: Not on file     Attends [employer] service: Not on file     Active member of club or organization: Not on file     Attends meetings of clubs or organizations: Not on file     Relationship status: Not on file    Intimate partner violence     Fear of current or ex partner: Not on file     Emotionally abused: Not on file     Physically abused: Not on file     Forced sexual activity: Not on file   Other Topics Concern    Not on file   Social History Narrative    Not on file         ALLERGIES: Contrast agent [iodine]    Review of Systems   Constitutional: Negative for activity change, chills and fever. HENT: Negative for nosebleeds, sore throat, trouble swallowing and voice change. Eyes: Negative for visual disturbance. Respiratory: Negative for shortness of breath. Cardiovascular: Negative for chest pain and palpitations. Gastrointestinal: Positive for abdominal pain and anorexia. Negative for constipation, diarrhea, flatus, hematochezia, melena, nausea and vomiting. Genitourinary: Negative for difficulty urinating, dysuria, frequency, hematuria and urgency. Musculoskeletal: Negative for arthralgias, back pain, myalgias, neck pain and neck stiffness. Skin: Negative for color change. Allergic/Immunologic: Negative for immunocompromised state. Neurological: Negative for dizziness, seizures, syncope, weakness, light-headedness, numbness and headaches. Psychiatric/Behavioral: Negative for behavioral problems, confusion, hallucinations, self-injury and suicidal ideas. Vitals:    12/21/20 1606 12/21/20 1608   BP:  119/86   Pulse:  77   Resp:  16   Temp:  99.2 °F (37.3 °C)   SpO2:  99%   Weight:  76.9 kg (169 lb 8.5 oz)   Height: 5' 4.5\" (1.638 m) 5' 4.5\" (1.638 m)            Physical Exam  Vitals signs and nursing note reviewed. Constitutional:       General: She is not in acute distress. Appearance: She is well-developed. She is not diaphoretic.    HENT:      Head: Normocephalic and atraumatic. Eyes:      Pupils: Pupils are equal, round, and reactive to light. Neck:      Musculoskeletal: Normal range of motion and neck supple. Cardiovascular:      Rate and Rhythm: Normal rate and regular rhythm. Heart sounds: Normal heart sounds. No murmur. No friction rub. No gallop. Pulmonary:      Effort: Pulmonary effort is normal. No respiratory distress. Breath sounds: Normal breath sounds. No wheezing. Abdominal:      General: Bowel sounds are normal. There is no distension. Palpations: Abdomen is soft. Tenderness: There is generalized abdominal tenderness. There is no guarding or rebound. Musculoskeletal: Normal range of motion. Skin:     General: Skin is warm. Findings: No rash. Neurological:      Mental Status: She is alert and oriented to person, place, and time. Psychiatric:         Behavior: Behavior normal.         Thought Content: Thought content normal.         Judgment: Judgment normal.          MDM     This is a 28-year-old female with past medical history, review of systems, physical exam as above, presenting with complaints of abdominal pain. Patient states she has had several weeks of suprapubic abdominal pain, treated by primary care for urinary tract infection and then recently at another emergency department for yeast infection. She denies fevers or chills, states she has been cutting back on NSAIDs for pain control. She states her last dose of antibiotics this morning she is unclear as to what antibiotic she is taking. She denies a history of abdominal surgeries, or colonoscopy. She was transported by EMS as she states her car broke down on the way back to the hospital.  Physical exam remarkable for well-appearing young female, in no acute distress with diffuse mild abdominal tenderness without rebound or guarding, clear breath sounds, regular rate and rhythm without murmurs gallops or rubs.   She is noted to be afebrile, normotensive without tachycardia. Differential includes gastroenteritis, urinary tract infection, pyelonephritis. Plan to offer Bentyl, obtain CMP, CBC, UA, lipase, UPT, CT scan of the abdomen and pelvis. We will reassess, and make a disposition. Procedures    5:22 PM  CT scan reassuring, physical exam nonspecific, lab work with mildly elevated white count and possibly clearing urinary tract infection. Discussed with patient possible bladder spasms, no interstitial cystitis or other acute process on CT. Discussed Pyridium, primary care follow-up, return precautions given.

## 2020-12-22 PROBLEM — Z72.0 TOBACCO USE: Status: ACTIVE | Noted: 2020-12-22

## 2020-12-22 PROBLEM — F43.10 PTSD (POST-TRAUMATIC STRESS DISORDER): Status: ACTIVE | Noted: 2020-12-22

## 2020-12-22 PROBLEM — N93.9 ABNORMAL UTERINE BLEEDING: Status: ACTIVE | Noted: 2020-12-22

## 2020-12-22 PROBLEM — F41.9 ANXIETY: Status: ACTIVE | Noted: 2020-12-22

## 2020-12-22 PROBLEM — F32.9 MDD (MAJOR DEPRESSIVE DISORDER): Status: ACTIVE | Noted: 2020-12-22

## 2020-12-22 LAB
AMPHET UR QL SCN: POSITIVE
APPEARANCE UR: CLEAR
BACTERIA URNS QL MICRO: ABNORMAL /HPF
BARBITURATES UR QL SCN: NEGATIVE
BENZODIAZ UR QL: NEGATIVE
BILIRUB UR QL CFM: NEGATIVE
CANNABINOIDS UR QL SCN: POSITIVE
COCAINE UR QL SCN: NEGATIVE
COLOR UR: ABNORMAL
COVID-19 RAPID TEST, COVR: NOT DETECTED
DRUG SCRN COMMENT,DRGCM: ABNORMAL
EPITH CASTS URNS QL MICRO: ABNORMAL /LPF
GLUCOSE UR STRIP.AUTO-MCNC: NEGATIVE MG/DL
HEALTH STATUS, XMCV2T: NORMAL
HGB UR QL STRIP: NEGATIVE
KETONES UR QL STRIP.AUTO: 40 MG/DL
LEUKOCYTE ESTERASE UR QL STRIP.AUTO: NEGATIVE
METHADONE UR QL: NEGATIVE
NITRITE UR QL STRIP.AUTO: NEGATIVE
OPIATES UR QL: NEGATIVE
PCP UR QL: NEGATIVE
PH UR STRIP: 6 [PH] (ref 5–8)
PROT UR STRIP-MCNC: NEGATIVE MG/DL
RBC #/AREA URNS HPF: ABNORMAL /HPF (ref 0–5)
SARS-COV-2, COV2: NOT DETECTED
SP GR UR REFRACTOMETRY: 1.04 (ref 1–1.03)
SPECIMEN SOURCE, FCOV2M: NORMAL
SPECIMEN TYPE, XMCV1T: NORMAL
UR CULT HOLD, URHOLD: NORMAL
UROBILINOGEN UR QL STRIP.AUTO: 0.2 EU/DL (ref 0.2–1)
WBC URNS QL MICRO: ABNORMAL /HPF (ref 0–4)

## 2020-12-22 PROCEDURE — 74011250637 HC RX REV CODE- 250/637: Performed by: PSYCHIATRY & NEUROLOGY

## 2020-12-22 PROCEDURE — 87635 SARS-COV-2 COVID-19 AMP PRB: CPT

## 2020-12-22 PROCEDURE — 65220000003 HC RM SEMIPRIVATE PSYCH

## 2020-12-22 PROCEDURE — 81001 URINALYSIS AUTO W/SCOPE: CPT

## 2020-12-22 PROCEDURE — 80307 DRUG TEST PRSMV CHEM ANLYZR: CPT

## 2020-12-22 RX ORDER — TRAZODONE HYDROCHLORIDE 50 MG/1
50 TABLET ORAL
Status: DISCONTINUED | OUTPATIENT
Start: 2020-12-22 | End: 2020-12-28 | Stop reason: HOSPADM

## 2020-12-22 RX ORDER — MEDROXYPROGESTERONE ACETATE 10 MG/1
10 TABLET ORAL DAILY
COMMUNITY

## 2020-12-22 RX ORDER — DEXTROAMPHETAMINE SACCHARATE, AMPHETAMINE ASPARTATE, DEXTROAMPHETAMINE SULFATE AND AMPHETAMINE SULFATE 5; 5; 5; 5 MG/1; MG/1; MG/1; MG/1
20 TABLET ORAL
COMMUNITY
End: 2020-12-28

## 2020-12-22 RX ORDER — IBUPROFEN 200 MG
1 TABLET ORAL DAILY
Status: DISCONTINUED | OUTPATIENT
Start: 2020-12-22 | End: 2020-12-28 | Stop reason: HOSPADM

## 2020-12-22 RX ORDER — ESCITALOPRAM OXALATE 10 MG/1
20 TABLET ORAL DAILY
Status: DISCONTINUED | OUTPATIENT
Start: 2020-12-22 | End: 2020-12-28 | Stop reason: HOSPADM

## 2020-12-22 RX ORDER — AZITHROMYCIN 250 MG/1
250 TABLET, FILM COATED ORAL DAILY
Status: DISPENSED | OUTPATIENT
Start: 2020-12-22 | End: 2020-12-25

## 2020-12-22 RX ORDER — DIPHENHYDRAMINE HYDROCHLORIDE 50 MG/ML
50 INJECTION, SOLUTION INTRAMUSCULAR; INTRAVENOUS
Status: DISCONTINUED | OUTPATIENT
Start: 2020-12-22 | End: 2020-12-28 | Stop reason: HOSPADM

## 2020-12-22 RX ORDER — ADHESIVE BANDAGE
30 BANDAGE TOPICAL DAILY PRN
Status: DISCONTINUED | OUTPATIENT
Start: 2020-12-22 | End: 2020-12-28 | Stop reason: HOSPADM

## 2020-12-22 RX ORDER — OLANZAPINE 5 MG/1
5 TABLET ORAL
Status: DISCONTINUED | OUTPATIENT
Start: 2020-12-22 | End: 2020-12-28 | Stop reason: HOSPADM

## 2020-12-22 RX ORDER — BENZTROPINE MESYLATE 1 MG/1
1 TABLET ORAL
Status: DISCONTINUED | OUTPATIENT
Start: 2020-12-22 | End: 2020-12-28 | Stop reason: HOSPADM

## 2020-12-22 RX ORDER — BUSPIRONE HYDROCHLORIDE 5 MG/1
5 TABLET ORAL 3 TIMES DAILY
Status: DISCONTINUED | OUTPATIENT
Start: 2020-12-22 | End: 2020-12-25

## 2020-12-22 RX ORDER — ESCITALOPRAM OXALATE 20 MG/1
20 TABLET ORAL DAILY
COMMUNITY

## 2020-12-22 RX ORDER — HYDROXYZINE 50 MG/1
50 TABLET, FILM COATED ORAL
Status: DISCONTINUED | OUTPATIENT
Start: 2020-12-22 | End: 2020-12-28 | Stop reason: HOSPADM

## 2020-12-22 RX ORDER — ACETAMINOPHEN 325 MG/1
650 TABLET ORAL
Status: DISCONTINUED | OUTPATIENT
Start: 2020-12-22 | End: 2020-12-28 | Stop reason: HOSPADM

## 2020-12-22 RX ORDER — MEDROXYPROGESTERONE ACETATE 10 MG/1
10 TABLET ORAL DAILY
Status: DISCONTINUED | OUTPATIENT
Start: 2020-12-23 | End: 2020-12-28 | Stop reason: HOSPADM

## 2020-12-22 RX ORDER — HALOPERIDOL 5 MG/ML
5 INJECTION INTRAMUSCULAR
Status: DISCONTINUED | OUTPATIENT
Start: 2020-12-22 | End: 2020-12-28 | Stop reason: HOSPADM

## 2020-12-22 RX ORDER — LORAZEPAM 2 MG/ML
1 INJECTION INTRAMUSCULAR
Status: DISCONTINUED | OUTPATIENT
Start: 2020-12-22 | End: 2020-12-28 | Stop reason: HOSPADM

## 2020-12-22 RX ORDER — ONDANSETRON 4 MG/1
4 TABLET, FILM COATED ORAL
COMMUNITY
End: 2020-12-28

## 2020-12-22 RX ADMIN — BUSPIRONE HYDROCHLORIDE 5 MG: 5 TABLET ORAL at 21:35

## 2020-12-22 RX ADMIN — ESCITALOPRAM OXALATE 20 MG: 10 TABLET ORAL at 12:17

## 2020-12-22 RX ADMIN — AZITHROMYCIN MONOHYDRATE 250 MG: 250 TABLET ORAL at 12:17

## 2020-12-22 RX ADMIN — BUSPIRONE HYDROCHLORIDE 5 MG: 5 TABLET ORAL at 17:48

## 2020-12-22 NOTE — BH NOTES
0600: TRANSFER - IN REPORT:    Verbal report received from [id number] Doe Rahman RN(name) on [employer]  being received from [facility](unit) for routine progression of care      Report consisted of patients Situation, Background, Assessment and   Recommendations(SBAR). Information from the following report(s) SBAR and ED Summary was reviewed with the receiving nurse. Opportunity for questions and clarification was provided. Assessment completed upon patients arrival to unit and care assumed.

## 2020-12-22 NOTE — CONSULTS
[address] [facility] Adult  Hospitalist Group  History and Physical    Primary Care Provider: None  Date of Service:  12/22/2020    Subjective:     Brennen Joshi is a 45 y.o. female with a past medical history of abnormal uterine bleeding, major depressive disorder, anxiety, and  PTSD. She was initially evaluated in ED on 12/18 for abdominal pain. She was discharged home. She return approximately 5 hours later stating that she needs to follow her safety plan and she is unable to take care of herself. Her behaviors were noted to be [place]" and she continued to cry but overall gave very little information. She was then admitted to [place] [id number]. She has had past psych admissions in [facility]. When asked if she was having SI or HI she states \"all this information is in my chart\"  Hospitalist was consulted for H&P. During examination patient would not make eye contact. She positions herself the opposite way. Conversation is minimal and she repeatedly states \"all this information is in my chart\". Per chart she has a history of abnormal uterine bleeding, has been seen multiple times for this. She has ablation scheduled with [facility] on 01/07/21. She states she takes a \"hormone pill\" but hasn't taken it in 2 days. Feels like she has started bleeding again but not sure. She endorses burning urination. Will not give any further information. Per chart: she was seen and discharged from [facility] on 12/16 with antibiotics (Keflex) which she states she completed course. Then was seen again in ED on 12/18, was then placed on azithromycin. Review of Systems:    A comprehensive review of systems was negative except for that written in the History of Present Illness. History reviewed. No pertinent past medical history. History reviewed. No pertinent surgical history. Prior to Admission medications    Medication Sig Start Date End Date Taking?  Authorizing Provider   medroxyPROGESTERone (PROVERA) 10 mg tablet Take 10 mg by mouth daily. Yes Provider, Historical   ondansetron hcl (ZOFRAN) 4 mg tablet Take 4 mg by mouth every eight (8) hours as needed for Nausea or Vomiting. Yes Provider, Historical   escitalopram oxalate (LEXAPRO) 20 mg tablet Take 20 mg by mouth daily. Yes Provider, Historical   dextroamphetamine-amphetamine (ADDERALL) 20 mg tablet Take 20 mg by mouth three (3) times daily as needed. Yes Provider, Historical   LORazepam (ATIVAN) 1 mg tablet Take 1 mg by mouth nightly as needed for Anxiety. Yes Thomas, MD Jocelyne   azithromycin (Zithromax Z-Rashi) 250 mg tablet Take 2 tablets today; 1 tablet daily for the next 4 days 12/18/20 12/23/20 Yes Richard Carlton NP   predniSONE (DELTASONE) 20 mg tablet Take 40 mg by mouth daily for 5 days. With Breakfast 12/18/20 12/23/20 Yes Richard Carlton NP   phenazopyridine (Pyridium) 200 mg tablet Take 1 Tab by mouth three (3) times daily for 2 days. 12/21/20 12/23/20  Annette Rosales MD   benzonatate (Tessalon Perles) 100 mg capsule Take 1 Cap by mouth three (3) times daily as needed for Cough for up to 7 days. 12/18/20 12/25/20  Graham Du NP     Allergies   Allergen Reactions    Contrast Agent [Iodine] Nausea and Vomiting      History reviewed. No pertinent family history. SOCIAL HISTORY:  Patient resides at [facility]. Patient ambulates with independent . Smoking history: Current everyday smoker of 1/2ppd   Alcohol history: Denies   Illicit drug use: Denies. UDS + Amphetamines and THC. Objective:       Physical Exam:   Visit Vitals  BP (!) 135/97   Pulse 85   Temp 98.3 °F (36.8 °C)   Resp 16   LMP 12/11/2020 (Approximate)   SpO2 96%     General appearance: alert, no distress, appears older than stated age  Lungs: clear to auscultation bilaterally  Heart: regular rate and rhythm, S1, S2 normal, no murmur, click, rub or gallop  Abdomen: soft, non-tender.  Bowel sounds normal. No masses,  no organomegaly  Extremities: extremities normal, atraumatic, no cyanosis or edema  Pulses: 2+ and symmetric  Neurologic: Grossly normal  Cap refill: Brisk, less than 3 seconds  Pulses: 2+, symmetric in all extremities    ECG:  na     Data Review: All diagnostic labs and studies have been reviewed. Chest x-ray na. Assessment:     Active Problems:    PTSD (post-traumatic stress disorder) (12/22/2020)      MDD (major depressive disorder) (12/22/2020)      Tobacco use (12/22/2020)      Anxiety (12/22/2020)      Abnormal uterine bleeding (12/22/2020)        Plan:     1. PTSD/ MDD/ Anxiety   - Management per primary team     2 Abnormal uterine bleeding   - Ablation 01/07 in [facility]   - Continue Medroxyprogesterone     3. UTI   - Complete course of antibiotics   - Encourage po intake     4. Tobacco abuse   - Smoking cessation education provided. - Continue nicotine patch      FUNCTIONAL STATUS PRIOR TO HOSPITALIZATION (including history of recent falls): Independent    Thank you for allowing us to participate in patient's care. Please do not hesitate to contact us again if you need any further assistance or have any questions. We will sign off now.      Signed By: Jremain Garcia NP     December 22, 2020

## 2020-12-22 NOTE — ED TRIAGE NOTES
States that she was seen here earlier and wants to follow her safety plan in place by her therapist.

## 2020-12-22 NOTE — PROGRESS NOTES
[address] [facility] 60  Master Treatment Plan for Stefanie Cart    Date Treatment Plan Initiated: 12/22/2020    Treatment Plan Modalities:  Type of Modality Amount  (x minutes) Frequency (x/week) Duration (x days) Name of Responsible Staff   Community & wrap-up meetings to encourage peer interactions 15 7 1 OCHOA Angel     Group psychotherapy to assist in building coping skills and internal controls 60 7 1 Alla Morales   Therapeutic activity groups to build coping skills 60 7 1 Alla Morales   Psychoeducation in group setting to address:   Medication education   15 [address] PharmD   Coping skills   30 3 1 Alla Morales   Relaxation techniques         Symptom management         Discharge planning   60 2 [facility]    60 2 1 Chaplain REICH   61 1 1 volunteer   Recovery/AA/NA      volunteer   Physician medication management   13 7 1 Dr. Julianna Vera, NP   Family meeting/discharge planning   15 2 1 Emir Jones and Jesusita Sibley                                     Problem: Depressed Mood (Adult/Pediatric)  Goal: *STG: Participates in treatment plan  Outcome: Progressing Towards Goal  Goal: *STG: Verbalizes anger, guilt, and other feelings in a constructive manor  Outcome: Progressing Towards Goal  Goal: Interventions  Outcome: Progressing Towards Goal       Pt resting in bed most of the morning, states she is exhausted. Pt attends treatment team with much encouragement. She is calm, minimally participates, answers are short and vague. Staff focus is on offering reassurance and obtaining more information from patient. Will continue to monitor with q15 minute checks for safety.

## 2020-12-22 NOTE — PROGRESS NOTES
Problem: Discharge Planning  Goal: *Discharge to safe environment  Outcome: Progressing Towards Goal  Note: Pt will participate in creating a safe discharge plan  Goal: *Knowledge of medication management  Outcome: Progressing Towards Goal  Note: Pt acknowledges her understanding of all prescribed medication  Goal: *Knowledge of discharge instructions  Outcome: Progressing Towards Goal  Note: Pt will be actively involved in all aspects of her discharge planning

## 2020-12-22 NOTE — BH NOTES
PSYCHOSOCIAL ASSESSMENT  :Patient identifying info:  Cecy Lozada is a 45 y.o., female admitted 12/21/2020 11:09 PM     Presenting problem and precipitating factors: Pt presented to the ED and was observed acting bizarrely, stating she needed a safe place to be. She denied SI/HI/AVH. No evidence of psychosis was noted, thought process demonstrated paranoia and perseveration. BSMART clinician had a difficult time assessing the pt, who was lying in the fetal position under a blanket, crying, stuttering, and hyperventilating. According to [facility] note, EMS was called to her car, earlier in the day, and brought her in for abdominal pain - normal behavior. Pt reports she was previously discharged from [facility] in July 2020 and [facility]\". Mental status assessment:    Strengths: LOC    Collateral information: LOC    Current psychiatric /substance abuse providers and contact info:   Current Psychiatrist is Dr. Taryn German in [place]    Previous psychiatric/substance abuse providers and response to treatment:   [facility], July 2020. Family history of mental illness or substance abuse: LOC    Substance abuse history:  UDS positive for THC and amphetamines  Social History     Tobacco Use    Smoking status: Current Every Day Smoker    Smokeless tobacco: Never Used   Substance Use Topics    Alcohol use: No       History of biomedical complications associated with substance abuse :    Patient's current acceptance of treatment or motivation for change:    Family constellation:   LOC    Is significant other involved?    Single    Describe support system:   None reported    Describe living arrangements and home environment:  Homeless    Health issues:   Hospital Problems  Never Reviewed          Codes Class Noted POA    PTSD (post-traumatic stress disorder) ICD-10-CM: F43.10  ICD-9-CM: 309.81  12/22/2020 Unknown        MDD (major depressive disorder) ICD-10-CM: F32.9  ICD-9-CM: 296.20  2020 Unknown              Trauma history: Hx of trauma - no specifics    Legal issues: no    History of  service: no    Financial status: LOC      Presybeterian/cultural factors:     Education/work history: LOC    Have you been licensed as a health care professional (current or ):   No    Leisure and recreation preferences:     Describe coping skills:  Daniel Coronado  2020

## 2020-12-22 NOTE — BSMART NOTE
Patient admitted to [address] [place] [address] Room 748 Dr. Keara Looney consulted GILLIAN Velazquez. [phone]
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
## 2020-12-22 NOTE — PROGRESS NOTES
Admission Medication Reconciliation:    Information obtained from:  Patient interview, VAPMP, communication with [facility] ([phone]), [facility] ([phone]), and [address] [place] [address] ([phone])  RxQuery data available¹:  YES    Comments/Recommendations: Updated PTA meds/reviewed patient's allergies. 1)  Patient brought in #3 medication bottles (Z-lila 3 days left, benzonatate 100mg, and lorazepam 1mg with #5 naproxen). Pharmacist called three pharmacies to confirm medications. [facility] ondansetron, medroxyprogesterone, escitalopram, lorazepam, dextroamphetamine/amphetamine, and phenazopyridine (this has not yet been picked up). [facility] filled one time ketorolac script in 11/5/20. [facility] filled z-lila, prednisone, and benzonatate on 12/19/20 and filled cephalexin, dicyclomine, and ondansetron on 12/9/20. The patient provided limited information about her medications during treatment team.    2)  Medication changes (since last review): Added  - dextroamphetamine/amphetamine 20mg TID PRN  - medroxyprogesterone 10mg daily  - escitalopram 20mg daily  - ondansetron 4mg q8h PRN N/V    Adjusted  - lorazepam 1mg QHS PRN (q6h PRN)    Removed  - Natazia, Loestrin, ketorolac    3)  The [place] Prescription Monitoring Program () was assessed to determine fill history of any controlled medications. The patient has filled the following controlled medications in the last 6 months. - 12/4/20: lorazepam 1mg, #30 for 30 day supply  - 12/4/20: dextroamphat/amphetamine 20mg, #90 for 30 day supply  - 10/9/20: dextroamphat/amphetamine 20mg, #90 for 30 day supply  - 10/9/20: lorazepam 1mg, #30 for 30 day supply  - 9/3/20: lorazepam 0.5mg, #30 for 5 day supply  - 8/24/20: dextroamphat/amphetamine 20mg, #90 for 30 day supply  - 8/7/20: lorazepam 0.5mg, #30 for 5 day supply  - 7/24/20: dextroamphat/amphetamine 20mg, #90 for 30 day supply  All medications prescribed by Dr. Matt Orourke.    ¹RxQuery pharmacy benefit data reflects medications filled and processed through the patient's insurance, however   this data does NOT capture whether the medication was picked up or is currently being taken by the patient. Allergies:  Contrast agent [iodine]    Significant PMH/Disease States: History reviewed. No pertinent past medical history. Chief Complaint for this Admission:    Chief Complaint   Patient presents with    Mental Health Problem     Prior to Admission Medications:   Prior to Admission Medications   Prescriptions Last Dose Informant Taking? LORazepam (ATIVAN) 1 mg tablet   Yes   Sig: Take 1 mg by mouth nightly as needed for Anxiety. azithromycin (Zithromax Z-Rashi) 250 mg tablet   Yes   Sig: Take 2 tablets today; 1 tablet daily for the next 4 days   benzonatate (Tessalon Perles) 100 mg capsule Unknown at Unknown time  No   Sig: Take 1 Cap by mouth three (3) times daily as needed for Cough for up to 7 days. dextroamphetamine-amphetamine (ADDERALL) 20 mg tablet   Yes   Sig: Take 20 mg by mouth three (3) times daily as needed. escitalopram oxalate (LEXAPRO) 20 mg tablet   Yes   Sig: Take 20 mg by mouth daily. medroxyPROGESTERone (PROVERA) 10 mg tablet   Yes   Sig: Take 10 mg by mouth daily. ondansetron hcl (ZOFRAN) 4 mg tablet   Yes   Sig: Take 4 mg by mouth every eight (8) hours as needed for Nausea or Vomiting. phenazopyridine (Pyridium) 200 mg tablet Not Taking at Unknown time  No   Sig: Take 1 Tab by mouth three (3) times daily for 2 days. predniSONE (DELTASONE) 20 mg tablet   Yes   Sig: Take 40 mg by mouth daily for 5 days.  With Breakfast      Facility-Administered Medications: None     Abel Boo

## 2020-12-22 NOTE — BSMART NOTE
Comprehensive Assessment Form Part 1 Section I - Disposition Axis I - PTSD, Major Depressive Disorder Axis II - Deferred Axis III - None Axis IV - Homeless [place] V - 40 The Medical Doctor to Psychiatrist conference was not completed. The Medical Doctor is in agreement with Psychiatrist disposition because of (reason) patient is requesting voluntary admission. The plan is admit to appropriate psychiatric bed once medically cleared. The on-call Psychiatrist consulted was Dr. Natalie Walton. The admitting Psychiatrist will be Dr. Natalie Walton. The admitting Diagnosis is PTSD. The Payor source is [facility]. Section II - Integrated Summary Summary:  Patient is a 45year old female seen via [employer]. Patient would not sit up and talk to this clinician on the telepsych machine as she was in the fetal position and under a blanket. She was crying, hyperventilating, and stuttering. This clinician had a phone brought to her so she could be understood and patient fully answered all questions, however it took some time due to her presentation. She was oriented but quite bizarre. She had been seen earlier in the day at the same ER for abdominal pain where it appears she presented differently. She came in that time via EMS who apparently reported they had been called to her car which was possibly broken down. Patient left the ER but came back several hours later crying and acting strangely. She reported she \"needs to follow my safety plan\" and requested to go to [facility]. Patient reported she was last admitted to [facility] in July 2020. She denied suicidal and homicidal ideation, but continued to repeat \"I need a safe place. I'm not safe. I can't take anymore. \"  Patient reported a PTSD diagnosis and that she hasn't had her medication for \"a while. \"  She continues to act bizarrely and cry. She is requesting voluntary admission. ER physician Dr. Jimmy Estrada reported he does not believe patient is safe to be discharged in her current state. Patient has never been seen for mental health reasons in any [address] [place] [address] and there are no indications that she has ever behaved this way in any previous [facility] ER visits. Patient will be admitted once medically cleared unless her behavior changes and she needs to be reassessed. The patient has demonstrated mental capacity to provide informed consent. The information is given by the patient. The Chief Complaint is mental health problem. The Precipitant Factors are homeless. Previous Hospitalizations: yes The patient has not previously been in restraints. Current Psychiatrist is Dr. Jeffrey Castillo at [facility]. Lethality Assessment: 
 
The potential for suicide is not noted. The potential for homicide is not noted. The patient has not been a perpetrator of sexual or physical abuse. There are not pending charges. The patient is not felt to be at risk for self harm or harm to others. The attending nurse was advised the patient needs supervision. Section III - Psychosocial 
The patient's overall mood and attitude is anxious and bizarre. Feelings of helplessness and hopelessness are observed by patient's self report. Generalized anxiety is observed by patient's behavior. Panic is observed by patient's behavior. Phobias are not observed. Obsessive compulsive tendencies are not observed. Section IV - Mental Status Exam 
The patient's appearance is bizarre. The patient's behavior shows poor eye contact and is restless. The patient is oriented to time, place, person and situation. The patient's speech is impoverished. The patient's mood is anxious. The range of affect is labile. The patient's thought content demonstrates paranoia. The thought process perseveration. The patient's perception shows no evidence of impairment. The patient's memory shows no evidence of impairment. The patient's appetite shows no evidence of impairment. The patient's sleep has evidence of insomnia. The patient's insight is blaming. The patient's judgement is psychologically impaired. Section V - Substance Abuse The patient is not using substances according to her. She has not provided a urine sample yet for a UDS. Section VI - Living Arrangements The patient is single. The patient is homeless. Whether the patient has children was not assessed. The patient does not plan to return home upon discharge. The patient does not have legal issues pending. The patient's source of income comes from unknown. Sikh and cultural practices have not been voiced at this time. The patient's greatest support comes from unknown and this person will not be involved with the treatment. The patient has been in an event described as horrible or outside the realm of ordinary life experience either currently or in the past. 
The patient has not been a victim of sexual/physical abuse. Section VII - Other Areas of Clinical Concern The highest grade achieved is not assessed with the overall quality of school experience being described as unknown. The patient is currently unemployed and speaks [place] as a primary language. The patient has no communication impairments affecting communication. The patient's preference for learning can be described as: can read and write adequately.   The patient's hearing is normal.  The patient's vision is normal. 
 
 
Erendira Nicholson MA

## 2020-12-22 NOTE — BH NOTES
GROUP THERAPY PROGRESS NOTE     Patient is participating in Anxiety Group.      Group time: 30 minutes     Personal goal for participation: learn how to control mind and body connection      Goal orientation: Personal     Group therapy participation: pt did not attend group     Therapeutic interventions reviewed and discussed: How the mind and body are closely connected and can influence each other in a feedback loop whereby the mind creates feelings of tension in the body, and heightened feelings in the body leads to over thinking in the mind. Impression of participation: Pt did not participated in group.     Stanley PACKER

## 2020-12-22 NOTE — ED PROVIDER NOTES
Date of Service:  12/21/2020    Patient:  Bj Wisdom    Chief Complaint:  Mental Health Problem       HPI:  Bj Wisdom is a 45 y.o.  female who presents for evaluation of a mental health issue. Patient only notes she wants admitted to [facility] and to follow safety plan established with her mental health provider. She denies other medical complaints. Will not answer about SI/HI but noted earlier in triage she has non ideations. She does not feel as though she can take care of herself. No other infomration provided by patient. History reviewed. No pertinent past medical history. History reviewed. No pertinent surgical history. History reviewed. No pertinent family history.     Social History     Socioeconomic History    Marital status: SINGLE     Spouse name: Not on file    Number of children: Not on file    Years of education: Not on file    Highest education level: Not on file   Occupational History    Not on file   Social Needs    Financial resource strain: Not on file    Food insecurity     Worry: Not on file     Inability: Not on file    Transportation needs     Medical: Not on file     Non-medical: Not on file   Tobacco Use    Smoking status: Current Every Day Smoker    Smokeless tobacco: Never Used   Substance and Sexual Activity    Alcohol use: No    Drug use: Yes     Types: Marijuana    Sexual activity: Not on file   Lifestyle    Physical activity     Days per week: Not on file     Minutes per session: Not on file    Stress: Not on file   Relationships    Social connections     Talks on phone: Not on file     Gets together: Not on file     Attends Baptism service: Not on file     Active member of club or organization: Not on file     Attends meetings of clubs or organizations: Not on file     Relationship status: Not on file    Intimate partner violence     Fear of current or ex partner: Not on file     Emotionally abused: Not on file     Physically abused: Not on file     Forced sexual activity: Not on file   Other Topics Concern    Not on file   Social History Narrative    Not on file         ALLERGIES: Contrast agent [iodine]    Review of Systems   Unable to perform ROS: Psychiatric disorder       Vitals:    12/21/20 2313   Temp: 98.2 °F (36.8 °C)            Physical Exam  Vitals signs and nursing note reviewed. Constitutional:       General: She is in acute distress. Appearance: She is not ill-appearing, toxic-appearing or diaphoretic. HENT:      Head: Normocephalic and atraumatic. Nose: Nose normal.      Mouth/Throat:      Mouth: Mucous membranes are moist.   Eyes:      General: No scleral icterus. Neck:      Musculoskeletal: Normal range of motion. Cardiovascular:      Rate and Rhythm: Normal rate. Pulmonary:      Effort: Pulmonary effort is normal.   Abdominal:      General: Abdomen is flat. Musculoskeletal:         General: No deformity. Skin:     General: Skin is warm. Capillary Refill: Capillary refill takes less than 2 seconds. Coloration: Skin is not jaundiced. Neurological:      Mental Status: She is alert and oriented to person, place, and time. Psychiatric:         Mood and Affect: Mood is anxious. Affect is tearful. Comments: Will not answer SI/HI to me          MDM  ED Course as of Dec 22 0419   Tue Dec 22, 2020   0054 Cancel lactic acid    [GG]   0302 AMPHETAMINES(!): Positive [GG]   0302 THC (TH-CANNABINOL)(! ): Positive [GG]   0411 COVID-19 rapid test: Not detected [GG]      ED Course User Index  [GG] Sweta Messina DO     VITAL SIGNS:  Patient Vitals for the past 4 hrs:   Temp   12/21/20 2313 98.2 °F (36.8 °C)         LABS:  Recent Results (from the past 6 hour(s))   SAMPLES BEING HELD    Collection Time: 12/21/20 11:26 PM   Result Value Ref Range    SAMPLES BEING HELD BLUE     COMMENT        Add-on orders for these samples will be processed based on acceptable specimen integrity and analyte stability, which may vary by analyte. CBC WITH AUTOMATED DIFF    Collection Time: 12/21/20 11:26 PM   Result Value Ref Range    WBC 13.3 (H) 3.6 - 11.0 K/uL    RBC 5.13 3.80 - 5.20 M/uL    HGB 14.9 11.5 - 16.0 g/dL    HCT 45.5 35.0 - 47.0 %    MCV 88.7 80.0 - 99.0 FL    MCH 29.0 26.0 - 34.0 PG    MCHC 32.7 30.0 - 36.5 g/dL    RDW 13.4 11.5 - 14.5 %    PLATELET 792 853 - 571 K/uL    MPV 9.4 8.9 - 12.9 FL    NRBC 0.0 0  WBC    ABSOLUTE NRBC 0.00 0.00 - 0.01 K/uL    NEUTROPHILS 47 32 - 75 %    LYMPHOCYTES 39 12 - 49 %    MONOCYTES 11 5 - 13 %    EOSINOPHILS 2 0 - 7 %    BASOPHILS 1 0 - 1 %    IMMATURE GRANULOCYTES 0 0.0 - 0.5 %    ABS. NEUTROPHILS 6.2 1.8 - 8.0 K/UL    ABS. LYMPHOCYTES 5.2 (H) 0.8 - 3.5 K/UL    ABS. MONOCYTES 1.5 (H) 0.0 - 1.0 K/UL    ABS. EOSINOPHILS 0.2 0.0 - 0.4 K/UL    ABS. BASOPHILS 0.1 0.0 - 0.1 K/UL    ABS. IMM. GRANS. 0.0 0.00 - 0.04 K/UL    DF AUTOMATED     METABOLIC PANEL, BASIC    Collection Time: 12/21/20 11:26 PM   Result Value Ref Range    Sodium 137 136 - 145 mmol/L    Potassium 3.9 3.5 - 5.1 mmol/L    Chloride 101 97 - 108 mmol/L    CO2 25 21 - 32 mmol/L    Anion gap 11 5 - 15 mmol/L    Glucose 96 65 - 100 mg/dL    BUN 15 6 - 20 MG/DL    Creatinine 0.70 0.55 - 1.02 MG/DL    BUN/Creatinine ratio 21 (H) 12 - 20      GFR est AA >60 >60 ml/min/1.73m2    GFR est non-AA >60 >60 ml/min/1.73m2    Calcium 9.5 8.5 - 10.1 MG/DL   HEPATIC FUNCTION PANEL    Collection Time: 12/21/20 11:26 PM   Result Value Ref Range    Protein, total 7.9 6.4 - 8.2 g/dL    Albumin 4.2 3.5 - 5.0 g/dL    Globulin 3.7 2.0 - 4.0 g/dL    A-G Ratio 1.1 1.1 - 2.2      Bilirubin, total 0.8 0.2 - 1.0 MG/DL    Bilirubin, direct 0.2 0.0 - 0.2 MG/DL    Alk.  phosphatase 33 (L) 45 - 117 U/L    AST (SGOT) 12 (L) 15 - 37 U/L    ALT (SGPT) 13 12 - 78 U/L   ETHYL ALCOHOL    Collection Time: 12/21/20 11:26 PM   Result Value Ref Range    ALCOHOL(ETHYL),SERUM <28 <06 MG/DL   SALICYLATE    Collection Time: 12/21/20 11:26 PM Result Value Ref Range    Salicylate level 4.2 2.8 - 20.0 MG/DL   ACETAMINOPHEN    Collection Time: 12/21/20 11:26 PM   Result Value Ref Range    Acetaminophen level <2 (L) 10 - 30 ug/mL        IMAGING:  No orders to display         Medications During Visit:  Medications - No data to display      DECISION MAKING:  Sola Carr is a 45 y.o. female who comes in as above. Patient is seen by Kiara Mo. We have spoken and both agree that the patients presentation is odd. She should remain in the hospital for additional psychiatric help. Patient agrees. Rapid Covid ordered. Awaiting UA. Will admit to psych once bed becomes available. Patient medically Cleared    IMPRESSION:  1.  Psychosis, unspecified psychosis type ([facility] [address]        DISPOSITION:  Admitted      Procedures

## 2020-12-22 NOTE — ED NOTES
Patient presents tearful and stating that she needs to get to [facility]. States that she has a safety plan in place. States that she does not feel like hurting herself but she cannot take care of herself right now.

## 2020-12-23 PROCEDURE — 65220000003 HC RM SEMIPRIVATE PSYCH

## 2020-12-23 PROCEDURE — 74011250637 HC RX REV CODE- 250/637: Performed by: PSYCHIATRY & NEUROLOGY

## 2020-12-23 PROCEDURE — 74011250637 HC RX REV CODE- 250/637: Performed by: NURSE PRACTITIONER

## 2020-12-23 RX ADMIN — AZITHROMYCIN MONOHYDRATE 250 MG: 250 TABLET ORAL at 09:44

## 2020-12-23 RX ADMIN — BUSPIRONE HYDROCHLORIDE 5 MG: 5 TABLET ORAL at 21:19

## 2020-12-23 RX ADMIN — BUSPIRONE HYDROCHLORIDE 5 MG: 5 TABLET ORAL at 09:44

## 2020-12-23 RX ADMIN — ESCITALOPRAM OXALATE 20 MG: 10 TABLET ORAL at 09:44

## 2020-12-23 RX ADMIN — MEDROXYPROGESTERONE ACETATE 10 MG: 10 TABLET ORAL at 09:44

## 2020-12-23 RX ADMIN — BUSPIRONE HYDROCHLORIDE 5 MG: 5 TABLET ORAL at 16:26

## 2020-12-23 NOTE — PROGRESS NOTES
Problem: Depressed Mood (Adult/Pediatric)  Goal: *STG: Participates in treatment plan  Outcome: Progressing Towards Goal  Note: Out on unit for meals and meds only. On edge, isolative, vague and deflective. Denies SI no plan. Reports feeling \" pretty good\". Describes not feeling overwhelmed and less irritable. Daily goal is to increase time out of bed. Staff focus is on offering support and reassurance.    Goal: *STG: Verbalizes anger, guilt, and other feelings in a constructive manor  Outcome: Progressing Towards Goal  Goal: *STG: Attends activities and groups  Outcome: Progressing Towards Goal  Goal: *STG: Demonstrates reduction in symptoms and increase in insight into coping skills/future focused  Outcome: Progressing Towards Goal  Goal: *STG: Complies with medication therapy  Outcome: Progressing Towards Goal  Goal: Interventions  Outcome: Progressing Towards Goal

## 2020-12-23 NOTE — BH NOTES
Chief Complaint:  I feel a little better. Length of Stay: 1 Days    Interval History:  Manny Doyle reports feeling slightly better today. Says she slept well last night and was able to eat with a good appetite. Says her mood is also better and describes herself as \"pretty good\". However nursing staff report that she has been irritable with peers and staff and remains isolative to her room. She has been much more conversant and her speech output is much improved. Denies any psychotic symptoms prior to admission. However she has been feeling \"sad and overwhelmed\"      Past Medical History:  History reviewed. No pertinent past medical history. Labs:  Lab Results   Component Value Date/Time    WBC 13.3 (H) 12/21/2020 11:26 PM    HGB 14.9 12/21/2020 11:26 PM    HCT 45.5 12/21/2020 11:26 PM    PLATELET 536 48/03/5450 11:26 PM    MCV 88.7 12/21/2020 11:26 PM      Lab Results   Component Value Date/Time    Sodium 137 12/21/2020 11:26 PM    Potassium 3.9 12/21/2020 11:26 PM    Chloride 101 12/21/2020 11:26 PM    CO2 25 12/21/2020 11:26 PM    Anion gap 11 12/21/2020 11:26 PM    Glucose 96 12/21/2020 11:26 PM    BUN 15 12/21/2020 11:26 PM    Creatinine 0.70 12/21/2020 11:26 PM    BUN/Creatinine ratio 21 (H) 12/21/2020 11:26 PM    GFR est AA >60 12/21/2020 11:26 PM    GFR est non-AA >60 12/21/2020 11:26 PM    Calcium 9.5 12/21/2020 11:26 PM    Bilirubin, total 0.8 12/21/2020 11:26 PM    Alk.  phosphatase 33 (L) 12/21/2020 11:26 PM    Protein, total 7.9 12/21/2020 11:26 PM    Albumin 4.2 12/21/2020 11:26 PM    Globulin 3.7 12/21/2020 11:26 PM    A-G Ratio 1.1 12/21/2020 11:26 PM    ALT (SGPT) 13 12/21/2020 11:26 PM      Vitals:    12/22/20 0710 12/22/20 0736 12/22/20 1947 12/23/20 0852   BP: (!) 135/97 (!) 135/97 130/86 129/86   Pulse: 89 85 86 79   Resp: 18 16 16 14   Temp:  98.3 °F (36.8 °C) 98.9 °F (37.2 °C) 98.5 °F (36.9 °C)   SpO2:  96% 97% 97%   LMP: 12/11/2020         Current Facility-Administered Medications Medication Dose Route Frequency Provider Last Rate Last Admin    OLANZapine (ZyPREXA) tablet 5 mg  5 mg Oral Q6H PRN Nicklas Hodgkin, MD        haloperidol lactate (HALDOL) injection 5 mg  5 mg IntraMUSCular Q6H PRN Nicklas Hodgkin, MD        benztropine (COGENTIN) tablet 1 mg  1 mg Oral BID PRN Nicklas Hodgkin, MD        diphenhydrAMINE (BENADRYL) injection 50 mg  50 mg IntraMUSCular BID PRN Nicklas Hodgkin, MD        hydrOXYzine HCL (ATARAX) tablet 50 mg  50 mg Oral TID PRN Nicklas Hodgkin, MD        LORazepam (ATIVAN) injection 1 mg  1 mg IntraMUSCular Q4H PRN Nicklas Hodgkin, MD        traZODone (DESYREL) tablet 50 mg  50 mg Oral QHS PRN Nicklas Hodgkin, MD        acetaminophen (TYLENOL) tablet 650 mg  650 mg Oral Q4H PRN Nicklas Hodgkin, MD        magnesium hydroxide (MILK OF MAGNESIA) 400 mg/5 mL oral suspension 30 mL  30 mL Oral DAILY PRN Nicklas Hodgkin, MD        nicotine (NICODERM CQ) 21 mg/24 hr patch 1 Patch  1 Patch TransDERmal DAILY Nicklas Hodgkin, MD   Stopped at 12/22/20 0900    influenza vaccine 2020-21 (6 mos+)(PF) (FLUARIX/FLULAVAL/FLUZONE QUAD) injection 0.5 mL  0.5 mL IntraMUSCular PRIOR TO DISCHARGE Nicklas Hodgkin, MD        escitalopram oxalate (LEXAPRO) tablet 20 mg  20 mg Oral DAILY Nicklas Hodgkin, MD   20 mg at 12/23/20 0944    azithromycin (ZITHROMAX) tablet 250 mg  250 mg Oral DAILY Nicklas Hodgkin, MD   250 mg at 12/23/20 0944    busPIRone (BUSPAR) tablet 5 mg  5 mg Oral TID Nicklas Hodgkin, MD   5 mg at 12/23/20 9714    medroxyPROGESTERone (PROVERA) tablet 10 mg  10 mg Oral DAILY Earle Frank, NP   10 mg at 12/23/20 0944         Mental Status Exam:  Eye contact: minimal  Grooming: fair  Psychomotor activity: Decreased. Speech is spontaneous  Mood is \"okay\"  Affect: depressed  Perception:  Suicidal ideation:   Cognition is grossly intact       Physical Exam:  Body habitus: There is no height or weight on file to calculate BMI.   Musculoskeletal system: normal gait  Tremor - neg  Cog rosemarying - reggie      Assessment and Plan:  Stan Stevenson meets criteria for a diagnosis of Mood disorder unspecified, rule out recurrent major depression, rule out bipolar disorder, rule out substance-induced mood disorder. Marijuana use disorder, mild. Continue the medication regimen as prescribed  Disposition planning to continue. I certify that this patients inpatient psychiatric hospital services furnished since the previous certification were, and continue to be, required for treatment that could reasonably be expected to improve the patient's condition, or for diagnostic study, and that the patient continues to need, on a daily basis, active treatment furnished directly by or requiring the supervision of inpatient psychiatric facility personnel. In addition, the hospital records show that services furnished were intensive treatment services, admission or related services, or equivalent services.

## 2020-12-23 NOTE — INTERDISCIPLINARY ROUNDS
Behavioral Health Interdisciplinary Rounds Patient Name: Kyung Altman  Age: 45 y.o. Room/Bed:  748/ Primary Diagnosis: <principal problem not specified> Admission Status: Voluntary Readmission within 30 days: no 
Power of  in place: no 
Patient requires a blocked bed: no          Reason for blocked bed: VTE Prophylaxis: No 
 
Mobility needs/Fall risk: no 
Flu Vaccine : no  
Nutritional Plan: no 
Consults:         
Labs/Testing due today?: no 
 
Sleep hours:  8+ Participation in Care/Groups:  no 
Medication Compliant?: Yes PRNS (last 24 hours): None Restraints (last 24 hours):  no 
  
CIWA (range last 24 hours): COWS (range last 24 hours): Alcohol screening (AUDIT) completed -   AUDIT Score: 0 If applicable, date SBIRT discussed in treatment team AND documented:  
AUDIT Screen Score: AUDIT Score: 0 Document Brief Intervention (corresponds directly with the 5 A's, Ask, Advise, Assess, Assist, and Arrange): At- Risk Patients (Score 7-15 for women; 8-15 for men) Discuss concern patient is drinking at unhealthy levels known to increase risk of alcohol-related health problems. Is Patient ready to commit to change? If No: 
? Encourage reflection ? Discuss short term and long term health risks of consuming alcohol ? Barriers to change ? Reaffirm willingness to help / Educational materials provided If Yes: 
? Set goal 
? Plan 
? Educational materials provided Harmful use or Dependence (Score 16 or greater) ? Discuss short term and long term health risks of consuming alcohol ? Recommendations ? Negotiate drinking goal 
? Recommend addiction specialist/center ? Arrange follow-up appointments. Tobacco - patient is a smoker: Have You Used Tobacco in the Past 30 Days: Yes Illegal Drugs use: Have You Used Any Illegal Substances Over the Past 12 Months: Yes 
 
24 hour chart check complete: Patient goal(s) for today: Increase time on the unit. Treatment team focus/goals: medication mgmt and safe discharge Progress note: Pt presents as irritable and guarded with eyes closed and head down for the duration of the meeting. Pt reports her mood as \"pretty good. \" She states she is less irritable and not as overwhelmed as she was when she was admitted, but also states, \"I feel sad, angry, overwhelmed by circumstances of 2020\". Pt was unable to sign treatment team documents/LAURENCE yesterday due to extreme sleepiness and irritability. Pt refuses to sign treatment team documents/LAURENCE, today. LOS:  1  Expected LOS:  
 
Financial concerns/prescription coverage: Kristin Bertrand Family contact: Mother, Bee Francois: [phone] and cell: [phone] LAURENCE NOT signed Family requesting physician contact today:  no 
Discharge plan: TBD Access to weapons :  no Outpatient provider(s): Dr. Norma Farmer, [facility]; Cherylene Bjork, LCSW Patient's preferred phone number for follow up call : [phone] [facility] Patient's preferred e-mail address :[email or web address] Participating treatment team members: ELMA Young; Torsten Bernal RN; ANA Reid

## 2020-12-23 NOTE — PROGRESS NOTES
Problem: Depressed Mood (Adult/Pediatric)  Goal: *STG: Participates in treatment plan  12/23/2020 1830 by Jihan Sibley RN  Outcome: Progressing Towards Goal  Variance Patient slowly responding  Pt complies with medications, but isolates in her room, offering no personal disclosures.
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
## 2020-12-23 NOTE — BH NOTES
Patient did not attend topic group at 1330. Patient completed the gratitude prompt at [facility] and will review during reflection group.

## 2020-12-23 NOTE — PROGRESS NOTES
Problem: Falls - Risk of  Goal: *Absence of Falls  Description: Document Alison Butler Fall Risk and appropriate interventions in the flowsheet.   Outcome: Progressing Towards Goal  Note: Fall Risk Interventions:            Medication Interventions: Teach patient to arise slowly

## 2020-12-24 PROCEDURE — 74011250637 HC RX REV CODE- 250/637: Performed by: PSYCHIATRY & NEUROLOGY

## 2020-12-24 PROCEDURE — 65220000003 HC RM SEMIPRIVATE PSYCH

## 2020-12-24 RX ADMIN — TRAZODONE HYDROCHLORIDE 50 MG: 50 TABLET ORAL at 21:38

## 2020-12-24 RX ADMIN — BUSPIRONE HYDROCHLORIDE 5 MG: 5 TABLET ORAL at 16:51

## 2020-12-24 RX ADMIN — BUSPIRONE HYDROCHLORIDE 5 MG: 5 TABLET ORAL at 21:38

## 2020-12-24 NOTE — PROGRESS NOTES
Problem: Depressed Mood (Adult/Pediatric)  Goal: *STG: Participates in treatment plan  Outcome: Progressing Towards Goal  Note: Up in bed continues to rest on bed with second mattress from other bed. Disengaged with a defensive and deflective attitude. Denies SI, no complaints voiced.  Does not share a daily goal. Staff focus is on offering support and encourage time out of room  Goal: *STG: Verbalizes anger, guilt, and other feelings in a constructive manor  Outcome: Progressing Towards Goal  Goal: *STG: Attends activities and groups  Outcome: Progressing Towards Goal  Goal: *STG: Demonstrates reduction in symptoms and increase in insight into coping skills/future focused  Outcome: Progressing Towards Goal  Goal: *STG: Complies with medication therapy  Outcome: Progressing Towards Goal  Goal: Interventions  Outcome: Progressing Towards Goal

## 2020-12-24 NOTE — PROGRESS NOTES
Problem: Falls - Risk of  Goal: *Absence of Falls  Description: Document Domonique Spann Fall Risk and appropriate interventions in the flowsheet. Outcome: Progressing Towards Goal  Note: Fall Risk Interventions:            Medication Interventions: Teach patient to arise slowly    Elimination Interventions:  Toilet paper/wipes in reach

## 2020-12-24 NOTE — PROGRESS NOTES
Problem: Discharge Planning  Goal: *Discharge to safe environment  Outcome: Progressing Towards Goal  Note: Pt will discharge to [facility] for crisis services  Goal: *Knowledge of medication management  Outcome: Progressing Towards Goal  Note: Pt acknowledges her understanding of the medications prescribed  Goal: *Knowledge of discharge instructions  Outcome: Progressing Towards Goal  Note: Pt is aware of the discharge plan to [facility]

## 2020-12-24 NOTE — INTERDISCIPLINARY ROUNDS
Behavioral Health Interdisciplinary Rounds Patient Name: Jesus Mann  Age: 45 y.o. Room/Bed:  748/ Primary Diagnosis: <principal problem not specified> Admission Status: Voluntary Readmission within 30 days: no 
Power of  in place: no 
Patient requires a blocked bed: no          Reason for blocked bed: VTE Prophylaxis: No 
 
Mobility needs/Fall risk: no 
Flu Vaccine : no  
Nutritional Plan: no 
Consults:         
Labs/Testing due today?: no 
 
Sleep hours:       
Participation in Care/Groups:  no 
Medication Compliant?: Yes PRNS (last 24 hours): None Restraints (last 24 hours):  no 
  
CIWA (range last 24 hours): COWS (range last 24 hours): Alcohol screening (AUDIT) completed -   AUDIT Score: 0 If applicable, date SBIRT discussed in treatment team AND documented:  
AUDIT Screen Score: AUDIT Score: 0 Document Brief Intervention (corresponds directly with the 5 A's, Ask, Advise, Assess, Assist, and Arrange): At- Risk Patients (Score 7-15 for women; 8-15 for men) Discuss concern patient is drinking at unhealthy levels known to increase risk of alcohol-related health problems. Is Patient ready to commit to change? If No: 
? Encourage reflection ? Discuss short term and long term health risks of consuming alcohol ? Barriers to change ? Reaffirm willingness to help / Educational materials provided If Yes: 
? Set goal 
? Plan 
? Educational materials provided Harmful use or Dependence (Score 16 or greater) ? Discuss short term and long term health risks of consuming alcohol ? Recommendations ? Negotiate drinking goal 
? Recommend addiction specialist/center ? Arrange follow-up appointments. Tobacco - patient is a smoker: Have You Used Tobacco in the Past 30 Days: Yes Illegal Drugs use: Have You Used Any Illegal Substances Over the Past 12 Months: Yes 
 
24 hour chart check complete: Patient goal(s) for today: attempt to be out on the unit more often Treatment team focus/goals: medication mgmt and safe discharge Progress note: Pt reports her anxiety is better today with a good nights sleep. She explains she is isolative because the activity on the unit \"Is overwhelming on a sensory level. \"  She also states she keeps her eyes closed during treatment team because it is a way to block some of the sensory overload. Pt is scheduled for discharge, 12/28/20 and will be referred to [facility]. Per Miguelina Martinez with [facility], they will have a bed available and will pick her up, Monday afternoon. SW attempted to contact Sweta Roa LCSW a second time - no answer. LOS:  2  Expected LOS:  
 
Financial concerns/prescription coverage: Kristin Bertrand Family contact: MotherDavid Son: [phone] and cell: [phone] LAURENCE NOT signed Family requesting physician contact today:  no 
Discharge plan: [facility] Access to weapons :   no Outpatient provider(s): Dr. Lauren Delatorre, [place]; Sweta Roa LCSW Patient's preferred phone number for follow up call : [phone] [facility] Patient's preferred e-mail address :[email or web address] Participating treatment team members: ELMA Waite; Jose Martinez RN; Narciso Javier;

## 2020-12-24 NOTE — PROGRESS NOTES
Problem: Depressed Mood (Adult/Pediatric)  Goal: *STG: Participates in treatment plan  Outcome: Progressing Towards Goal  Goal: *STG: Verbalizes anger, guilt, and other feelings in a constructive manor  Outcome: Progressing Towards Goal  Goal: *STG: Attends activities and groups  Outcome: Progressing Towards Goal  Goal: *STG: Demonstrates reduction in symptoms and increase in insight into coping skills/future focused  Outcome: Progressing Towards Goal  Goal: *STG: Complies with medication therapy  Outcome: Progressing Towards Goal  Goal: Interventions  Outcome: Progressing Towards Goal     Problem: Patient Education: Go to Patient Education Activity  Goal: Patient/Family Education  Outcome: Progressing Towards Goal   Pt will verbalize any increase in sx of depression too staff  Pt is engaged on unit  Pt is cooperative and pleasant at present

## 2020-12-24 NOTE — BH NOTES
Chief Complaint:  I feel better. Length of Stay: 2 Days    Interval History:  Bethanie West reports feeling better and has improved significantly. She remains isolated because she feels \"totally overwhelmed\" in interacting with peers. She does not make eye contact with peers and sits staring at the floor during the interview. Denies any SI or plan. Slept well last night and has not had any adverse events from her medications. Denies any AH or VH. Pleasant and cheerful. Past Medical History:  History reviewed. No pertinent past medical history. Labs:  Lab Results   Component Value Date/Time    WBC 13.3 (H) 12/21/2020 11:26 PM    HGB 14.9 12/21/2020 11:26 PM    HCT 45.5 12/21/2020 11:26 PM    PLATELET 776 07/73/2704 11:26 PM    MCV 88.7 12/21/2020 11:26 PM      Lab Results   Component Value Date/Time    Sodium 137 12/21/2020 11:26 PM    Potassium 3.9 12/21/2020 11:26 PM    Chloride 101 12/21/2020 11:26 PM    CO2 25 12/21/2020 11:26 PM    Anion gap 11 12/21/2020 11:26 PM    Glucose 96 12/21/2020 11:26 PM    BUN 15 12/21/2020 11:26 PM    Creatinine 0.70 12/21/2020 11:26 PM    BUN/Creatinine ratio 21 (H) 12/21/2020 11:26 PM    GFR est AA >60 12/21/2020 11:26 PM    GFR est non-AA >60 12/21/2020 11:26 PM    Calcium 9.5 12/21/2020 11:26 PM    Bilirubin, total 0.8 12/21/2020 11:26 PM    Alk.  phosphatase 33 (L) 12/21/2020 11:26 PM    Protein, total 7.9 12/21/2020 11:26 PM    Albumin 4.2 12/21/2020 11:26 PM    Globulin 3.7 12/21/2020 11:26 PM    A-G Ratio 1.1 12/21/2020 11:26 PM    ALT (SGPT) 13 12/21/2020 11:26 PM      Vitals:    12/23/20 1251 12/23/20 1545 12/23/20 2100 12/24/20 0815   BP: 126/84 111/61 123/80 109/65   Pulse: 82 77 82 98   Resp: 16 16 16 16   Temp: 98.3 °F (36.8 °C) 98.2 °F (36.8 °C) 98.3 °F (36.8 °C) 98.2 °F (36.8 °C)   SpO2: 95% 96% 97% 96%   LMP: 12/11/2020         Current Facility-Administered Medications   Medication Dose Route Frequency Provider Last Rate Last Admin    OLANZapine (ZyPREXA) tablet 5 mg  5 mg Oral Q6H PRN Ana Loera MD        haloperidol lactate (HALDOL) injection 5 mg  5 mg IntraMUSCular Q6H PRN Ana Loera MD        benztropine (COGENTIN) tablet 1 mg  1 mg Oral BID PRN Ana Loera MD        diphenhydrAMINE (BENADRYL) injection 50 mg  50 mg IntraMUSCular BID PRN Ana Loera MD        hydrOXYzine HCL (ATARAX) tablet 50 mg  50 mg Oral TID PRN Ana Loera MD        LORazepam (ATIVAN) injection 1 mg  1 mg IntraMUSCular Q4H PRN Ana Loera MD        traZODone (DESYREL) tablet 50 mg  50 mg Oral QHS PRN Ana Loera MD        acetaminophen (TYLENOL) tablet 650 mg  650 mg Oral Q4H PRN Ana Loera MD        magnesium hydroxide (MILK OF MAGNESIA) 400 mg/5 mL oral suspension 30 mL  30 mL Oral DAILY PRN Ana Loera MD        nicotine (NICODERM CQ) 21 mg/24 hr patch 1 Patch  1 Patch TransDERmal DAILY Ana Loera MD   Stopped at 12/22/20 0900    influenza vaccine 2020-21 (6 mos+)(PF) (FLUARIX/FLULAVAL/FLUZONE QUAD) injection 0.5 mL  0.5 mL IntraMUSCular PRIOR TO DISCHARGE Ana Loera MD        escitalopram oxalate (LEXAPRO) tablet 20 mg  20 mg Oral DAILY Ana Loera MD   20 mg at 12/23/20 0944    azithromycin (ZITHROMAX) tablet 250 mg  250 mg Oral DAILY Ana Loera MD   250 mg at 12/23/20 0944    busPIRone (BUSPAR) tablet 5 mg  5 mg Oral TID Ana Loera MD   5 mg at 12/23/20 2119    medroxyPROGESTERone (PROVERA) tablet 10 mg  10 mg Oral DAILY Thirza Sportsman, NP   10 mg at 12/23/20 0944         Mental Status Exam:  Eye contact: minimal  Grooming: fair  Psychomotor activity: Decreased. Speech is spontaneous  Mood is \"okay\"  Affect: depressed  Perception:  Suicidal ideation:   Cognition is grossly intact       Physical Exam:  Body habitus: There is no height or weight on file to calculate BMI.   Musculoskeletal system: normal gait  Tremor - neg  Cog wheeling - neg      Assessment and Plan:  Kathy Barrera meets criteria for a diagnosis of Mood disorder unspecified, rule out recurrent major depression, rule out bipolar disorder, rule out substance-induced mood disorder. Marijuana use disorder, mild. Continue the medication regimen as prescribed  Disposition planning to continue. I certify that this patients inpatient psychiatric hospital services furnished since the previous certification were, and continue to be, required for treatment that could reasonably be expected to improve the patient's condition, or for diagnostic study, and that the patient continues to need, on a daily basis, active treatment furnished directly by or requiring the supervision of inpatient psychiatric facility personnel. In addition, the hospital records show that services furnished were intensive treatment services, admission or related services, or equivalent services.

## 2020-12-25 PROCEDURE — 65220000003 HC RM SEMIPRIVATE PSYCH

## 2020-12-25 PROCEDURE — 74011250637 HC RX REV CODE- 250/637: Performed by: PSYCHIATRY & NEUROLOGY

## 2020-12-25 PROCEDURE — 74011250637 HC RX REV CODE- 250/637: Performed by: NURSE PRACTITIONER

## 2020-12-25 RX ORDER — BUSPIRONE HYDROCHLORIDE 10 MG/1
10 TABLET ORAL 3 TIMES DAILY
Status: DISCONTINUED | OUTPATIENT
Start: 2020-12-25 | End: 2020-12-28 | Stop reason: HOSPADM

## 2020-12-25 RX ADMIN — BUSPIRONE HYDROCHLORIDE 5 MG: 5 TABLET ORAL at 08:58

## 2020-12-25 RX ADMIN — ESCITALOPRAM OXALATE 20 MG: 10 TABLET ORAL at 08:58

## 2020-12-25 RX ADMIN — BUSPIRONE HYDROCHLORIDE 10 MG: 10 TABLET ORAL at 17:20

## 2020-12-25 RX ADMIN — MEDROXYPROGESTERONE ACETATE 10 MG: 10 TABLET ORAL at 08:58

## 2020-12-25 RX ADMIN — TRAZODONE HYDROCHLORIDE 50 MG: 50 TABLET ORAL at 22:40

## 2020-12-25 RX ADMIN — BUSPIRONE HYDROCHLORIDE 10 MG: 10 TABLET ORAL at 21:30

## 2020-12-25 NOTE — PROGRESS NOTES
Problem: Falls - Risk of  Goal: *Absence of Falls  Description: Document Ernestine Sahu Fall Risk and appropriate interventions in the flowsheet. Outcome: Progressing Towards Goal  Note: Fall Risk Interventions:  Mobility Interventions: Assess mobility with egress test         Medication Interventions: Teach patient to arise slowly    Elimination Interventions:  Toilet paper/wipes in reach    History of Falls Interventions: Door open when patient unattended    PRN Medication Documentation    Specific patient behavior that led to need for PRN medication: trouble sleeping  Staff interventions attempted prior to PRN being given: relaxation techniques  PRN medication given: trazodone

## 2020-12-25 NOTE — INTERDISCIPLINARY ROUNDS
Behavioral Health Interdisciplinary Rounds Patient Name: Osiel Pablo  Age: 45 y.o. Room/Bed:  748/ Primary Diagnosis: <principal problem not specified> Admission Status: Voluntary Readmission within 30 days: no 
Power of  in place: no 
Patient requires a blocked bed: no          Reason for blocked bed: VTE Prophylaxis: No 
 
Mobility needs/Fall risk: no 
Flu Vaccine : no  
Nutritional Plan: no 
Consults:         
Labs/Testing due today?: no 
 
Sleep hours:      
Participation in Care/Groups:  no 
Medication Compliant?: Yes PRNS (last 24 hours): Sleep Aid Restraints (last 24 hours):  no 
  
CIWA (range last 24 hours): COWS (range last 24 hours): Alcohol screening (AUDIT) completed -   AUDIT Score: 0 If applicable, date SBIRT discussed in treatment team AND documented:  
AUDIT Screen Score: AUDIT Score: 0 Document Brief Intervention (corresponds directly with the 5 A's, Ask, Advise, Assess, Assist, and Arrange): At- Risk Patients (Score 7-15 for women; 8-15 for men) Discuss concern patient is drinking at unhealthy levels known to increase risk of alcohol-related health problems. Is Patient ready to commit to change? If No: 
? Encourage reflection ? Discuss short term and long term health risks of consuming alcohol ? Barriers to change ? Reaffirm willingness to help / Educational materials provided If Yes: 
? Set goal 
? Plan 
? Educational materials provided Harmful use or Dependence (Score 16 or greater) ? Discuss short term and long term health risks of consuming alcohol ? Recommendations ? Negotiate drinking goal 
? Recommend addiction specialist/center ? Arrange follow-up appointments. Tobacco - patient is a smoker: Have You Used Tobacco in the Past 30 Days: Yes Illegal Drugs use: Have You Used Any Illegal Substances Over the Past 12 Months: Yes 
 
24 hour chart check complete: yes Patient goal(s) for today: Treatment team focus/goals:  
Progress note LOS:  3  Expected LOS:  
 
Financial concerns/prescription coverage:   
Family contact:      
Family requesting physician contact today:   
Discharge plan: Access to weapons :        
Outpatient provider(s):  
Patient's preferred phone number for follow up call :  
Patient's preferred e-mail address : 
Participating treatment team members: Ricardo Marie, * (assigned SW),

## 2020-12-25 NOTE — PROGRESS NOTES
12:05 pm-stated pt she was able to sleep last night. Stated her anxiety has decreased. Problem: Falls - Risk of  Goal: *Absence of Falls  Description: Document Mckay Hansen Fall Risk and appropriate interventions in the flowsheet. Outcome: Progressing Towards Goal  Note: Fall Risk Interventions:  Mobility Interventions: Assess mobility with egress test         Medication Interventions: Teach patient to arise slowly    Elimination Interventions: Toilet paper/wipes in reach    History of Falls Interventions: Door open when patient unattended         Problem: Patient Education: Go to Patient Education Activity  Goal: Patient/Family Education  Outcome: Progressing Towards Goal     Problem: Depressed Mood (Adult/Pediatric)  Goal: *STG: Participates in treatment plan  Outcome: Progressing Towards Goal  Note: Pt isolating in her room. Little interaction with staff. Ate her meals and took medication as scheduled. Encouraged pt to attend groups and participate.   Goal: Interventions  Outcome: Progressing Towards Goal     Problem: Patient Education: Go to Patient Education Activity  Goal: Patient/Family Education  Outcome: Progressing Towards Goal

## 2020-12-25 NOTE — PROGRESS NOTES
Problem: Falls - Risk of  Goal: *Absence of Falls  Description: Document Gabriela Cummings Fall Risk and appropriate interventions in the flowsheet. Outcome: Progressing Towards Goal  Note: Fall Risk Interventions:  Mobility Interventions: Assess mobility with egress test         Medication Interventions: Teach patient to arise slowly    Elimination Interventions:  Toilet paper/wipes in reach    History of Falls Interventions: Door open when patient unattended       Pt appears asleep in bed, NAD, even respirations, will continue to monitor q15min

## 2020-12-25 NOTE — BH NOTES
Chief Complaint:  I feel better. Length of Stay: 3 Days    Interval History:  Lauris Goodpasture reports continued improvement. She remains isolative to her room and rarely interacts with peers and does not make any eye contact with them. She has come out of her room for breakfast and lunch and says she slept well last night. Mood is \"good\" and denies any SI or plan. No adverse events are noted from her medications. Past Medical History:  History reviewed. No pertinent past medical history. Labs:  Lab Results   Component Value Date/Time    WBC 13.3 (H) 12/21/2020 11:26 PM    HGB 14.9 12/21/2020 11:26 PM    HCT 45.5 12/21/2020 11:26 PM    PLATELET 931 85/41/9843 11:26 PM    MCV 88.7 12/21/2020 11:26 PM      Lab Results   Component Value Date/Time    Sodium 137 12/21/2020 11:26 PM    Potassium 3.9 12/21/2020 11:26 PM    Chloride 101 12/21/2020 11:26 PM    CO2 25 12/21/2020 11:26 PM    Anion gap 11 12/21/2020 11:26 PM    Glucose 96 12/21/2020 11:26 PM    BUN 15 12/21/2020 11:26 PM    Creatinine 0.70 12/21/2020 11:26 PM    BUN/Creatinine ratio 21 (H) 12/21/2020 11:26 PM    GFR est AA >60 12/21/2020 11:26 PM    GFR est non-AA >60 12/21/2020 11:26 PM    Calcium 9.5 12/21/2020 11:26 PM    Bilirubin, total 0.8 12/21/2020 11:26 PM    Alk.  phosphatase 33 (L) 12/21/2020 11:26 PM    Protein, total 7.9 12/21/2020 11:26 PM    Albumin 4.2 12/21/2020 11:26 PM    Globulin 3.7 12/21/2020 11:26 PM    A-G Ratio 1.1 12/21/2020 11:26 PM    ALT (SGPT) 13 12/21/2020 11:26 PM      Vitals:    12/24/20 0815 12/24/20 1645 12/24/20 2026 12/25/20 0800   BP: 109/65 128/89 132/86 119/66   Pulse: 98 81 61 84   Resp: 16 14 16 16   Temp: 98.2 °F (36.8 °C) 98.3 °F (36.8 °C) 98.5 °F (36.9 °C) 97.9 °F (36.6 °C)   SpO2: 96% 96%  97%   LMP: 12/11/2020         Current Facility-Administered Medications   Medication Dose Route Frequency Provider Last Rate Last Admin    OLANZapine (ZyPREXA) tablet 5 mg  5 mg Oral Q6H PRN MD Tyler Petit haloperidol lactate (HALDOL) injection 5 mg  5 mg IntraMUSCular Q6H PRN Lurdes Swanson MD        benztropine (COGENTIN) tablet 1 mg  1 mg Oral BID PRN Lurdes Swanson MD        diphenhydrAMINE (BENADRYL) injection 50 mg  50 mg IntraMUSCular BID PRN Lurdes Swanson MD        hydrOXYzine HCL (ATARAX) tablet 50 mg  50 mg Oral TID PRN Lurdes Swanson MD        LORazepam (ATIVAN) injection 1 mg  1 mg IntraMUSCular Q4H PRN Lurdes Swanson MD        traZODone (DESYREL) tablet 50 mg  50 mg Oral QHS PRN Lurdes Swanson MD   50 mg at 12/24/20 2138    acetaminophen (TYLENOL) tablet 650 mg  650 mg Oral Q4H PRN Lurdes Swanson MD        magnesium hydroxide (MILK OF MAGNESIA) 400 mg/5 mL oral suspension 30 mL  30 mL Oral DAILY PRN Lurdes Swanson MD        nicotine (NICODERM CQ) 21 mg/24 hr patch 1 Patch  1 Patch TransDERmal DAILY Lurdes Swanson MD   Stopped at 12/22/20 0900    influenza vaccine 2020-21 (6 mos+)(PF) (FLUARIX/FLULAVAL/FLUZONE QUAD) injection 0.5 mL  0.5 mL IntraMUSCular PRIOR TO DISCHARGE Lurdes Swanson MD        escitalopram oxalate (LEXAPRO) tablet 20 mg  20 mg Oral DAILY Lurdes Swanson MD   20 mg at 12/25/20 0858    busPIRone (BUSPAR) tablet 5 mg  5 mg Oral TID Lurdes Swanson MD   5 mg at 12/25/20 3475    medroxyPROGESTERone (PROVERA) tablet 10 mg  10 mg Oral DAILY Armen Boas, NP   10 mg at 12/25/20 0267         Mental Status Exam:  Eye contact: minimal  Grooming: fair  Psychomotor activity: Decreased. Speech is spontaneous  Mood is \"okay\"  Affect: depressed  Perception:  Suicidal ideation:   Cognition is grossly intact       Physical Exam:  Body habitus: There is no height or weight on file to calculate BMI. Musculoskeletal system: normal gait  Tremor - neg  Cog wheeling - neg      Assessment and Plan:  Emre Barrera meets criteria for a diagnosis of Mood disorder unspecified, rule out recurrent major depression, rule out bipolar disorder, rule out substance-induced mood disorder.   Marijuana use disorder, mild. Continue the medication regimen as prescribed  Disposition planning to continue. I certify that this patients inpatient psychiatric hospital services furnished since the previous certification were, and continue to be, required for treatment that could reasonably be expected to improve the patient's condition, or for diagnostic study, and that the patient continues to need, on a daily basis, active treatment furnished directly by or requiring the supervision of inpatient psychiatric facility personnel. In addition, the hospital records show that services furnished were intensive treatment services, admission or related services, or equivalent services.

## 2020-12-26 PROCEDURE — 65220000003 HC RM SEMIPRIVATE PSYCH

## 2020-12-26 PROCEDURE — 74011250637 HC RX REV CODE- 250/637: Performed by: PSYCHIATRY & NEUROLOGY

## 2020-12-26 PROCEDURE — 74011250637 HC RX REV CODE- 250/637: Performed by: NURSE PRACTITIONER

## 2020-12-26 RX ORDER — BUPROPION HYDROCHLORIDE 100 MG/1
100 TABLET ORAL DAILY
Status: DISCONTINUED | OUTPATIENT
Start: 2020-12-27 | End: 2020-12-28 | Stop reason: HOSPADM

## 2020-12-26 RX ADMIN — TRAZODONE HYDROCHLORIDE 50 MG: 50 TABLET ORAL at 21:05

## 2020-12-26 RX ADMIN — MEDROXYPROGESTERONE ACETATE 10 MG: 10 TABLET ORAL at 08:44

## 2020-12-26 RX ADMIN — BUSPIRONE HYDROCHLORIDE 10 MG: 10 TABLET ORAL at 21:05

## 2020-12-26 RX ADMIN — BUSPIRONE HYDROCHLORIDE 10 MG: 10 TABLET ORAL at 17:35

## 2020-12-26 RX ADMIN — BUSPIRONE HYDROCHLORIDE 10 MG: 10 TABLET ORAL at 08:44

## 2020-12-26 RX ADMIN — ESCITALOPRAM OXALATE 20 MG: 10 TABLET ORAL at 08:44

## 2020-12-26 NOTE — GROUP NOTE
[facility] GROUP DOCUMENTATION INDIVIDUAL Group Therapy Note Date: 12/25/2020 Group Start Time: 2000 Group End Time: 2030 Group Topic: Reflection/Relaxation [address] [place] [address] GROUP DOCUMENTATION GROUP Group Therapy Note Attendees: 5/8 Attendance: Attended Patient's Goal:  Discuss unit and good sleep hygiene Interventions/techniques: Informed, Provide feedback and Supported Follows Directions: Followed directions Interactions: Interacted appropriately Mental Status: Calm Behavior/appearance: Attentive, Cooperative and Withdrawn/quiet Goals Achieved: Able to listen to others Additional Notes: Staff met with group to provide information regarding the unit. Staff addressed questions regarding roles of staff and treatment team. Next, staff discussed good sleep habits versus bad sleep habits that may impact quality of sleep and falling asleep quickly at night. Pt was minimally engaged, she sat away from majority of group members. She engaged by nodding along to comments staff and others made during group and made eye contact with facilitator. Julio Ware

## 2020-12-26 NOTE — BH NOTES
Chief Complaint:  \"I'm OK. \"    Interval History:  Patient reports she is doing well. Feels more energetic. Denies sleep disturbance. Appetite is OK. Denies SI/HI. Denies AH/VH. Reports she is having some withdrawal effects from stopping her ADHD medication. Report benefits from Wellbutrin in the past.      Mental Status Exam:  General Presentation:  Fair grooming; maintains eye contact  Orientation:  Oriented in all spheres  Gait and Station:  No abnormality noted  Musculoskeletal System : WNL  Language: No aphasia or dysarthria  Speech:  Regular/normal volume and rate  Thought Processes:  logical  Thought Associations: goal directed  Thought Content:  Not delusional  Perception: Denies AH/VH  Suicidal Ideations:  Denies  Homicidal Ideations:  Denies  Mood:  OK  Affect:   Constricted  Insight:  Fair  Judgment:  Fair    Assessment and Plan:  Mood disorder unspecified, rule out recurrent major depression, rule out bipolar disorder, rule out substance-induced mood disorder.  Marijuana use disorder, mild    Commenced on Wellbutrin 100mg PO QAM  Disposition planning to continue. I certify that this patients inpatient psychiatric hospital services furnished since the previous certification were, and continue to be, required for treatment that could reasonably be expected to improve the patient's condition, or for diagnostic study, and that the patient continues to need, on a daily basis, active treatment furnished directly by or requiring the supervision of inpatient psychiatric facility personnel. In addition, the hospital records show that services furnished were intensive treatment services, admission or related services, or equivalent services.      Current Facility-Administered Medications   Medication Dose Route Frequency    busPIRone (BUSPAR) tablet 10 mg  10 mg Oral TID    nicotine (NICODERM CQ) 21 mg/24 hr patch 1 Patch  1 Patch TransDERmal DAILY    influenza vaccine 2020-21 (6 mos+)(PF) (FLUARIX/FLULAVAL/FLUZONE QUAD) injection 0.5 mL  0.5 mL IntraMUSCular PRIOR TO DISCHARGE    escitalopram oxalate (LEXAPRO) tablet 20 mg  20 mg Oral DAILY    medroxyPROGESTERone (PROVERA) tablet 10 mg  10 mg Oral DAILY       Physical Exam:         Past Medical History:  History reviewed. No pertinent past medical history. Labs:  Lab Results   Component Value Date/Time    WBC 13.3 (H) 12/21/2020 11:26 PM    HGB 14.9 12/21/2020 11:26 PM    HCT 45.5 12/21/2020 11:26 PM    PLATELET 222 21/85/8207 11:26 PM    MCV 88.7 12/21/2020 11:26 PM      Lab Results   Component Value Date/Time    Sodium 137 12/21/2020 11:26 PM    Potassium 3.9 12/21/2020 11:26 PM    Chloride 101 12/21/2020 11:26 PM    CO2 25 12/21/2020 11:26 PM    Anion gap 11 12/21/2020 11:26 PM    Glucose 96 12/21/2020 11:26 PM    BUN 15 12/21/2020 11:26 PM    Creatinine 0.70 12/21/2020 11:26 PM    BUN/Creatinine ratio 21 (H) 12/21/2020 11:26 PM    GFR est AA >60 12/21/2020 11:26 PM    GFR est non-AA >60 12/21/2020 11:26 PM    Calcium 9.5 12/21/2020 11:26 PM    Bilirubin, total 0.8 12/21/2020 11:26 PM    Alk.  phosphatase 33 (L) 12/21/2020 11:26 PM    Protein, total 7.9 12/21/2020 11:26 PM    Albumin 4.2 12/21/2020 11:26 PM    Globulin 3.7 12/21/2020 11:26 PM    A-G Ratio 1.1 12/21/2020 11:26 PM    ALT (SGPT) 13 12/21/2020 11:26 PM      Vitals:    12/25/20 1945 12/25/20 2334 12/26/20 0721 12/26/20 1559   BP: 114/74  119/80 123/78   Pulse: 83  65 83   Resp: 14  16 16   Temp: 98.5 °F (36.9 °C)  98.5 °F (36.9 °C) 98 °F (36.7 °C)   SpO2: 98%  98% 99%   Weight:  77.1 kg (170 lb)     Height:  5' 4.5\" (1.638 m)     LMP: 12/11/2020

## 2020-12-26 NOTE — PROGRESS NOTES
Pt lying in bed, appears to be sleeping. No distress noted. Respirations WNL. Will continue to monitor q15 for safety. Problem: Falls - Risk of  Goal: *Absence of Falls  Description: Document Viji Neils Fall Risk and appropriate interventions in the flowsheet. Outcome: Progressing Towards Goal  Note: Fall Risk Interventions:  Mobility Interventions: Assess mobility with egress test         Medication Interventions: Teach patient to arise slowly    Elimination Interventions:  Toilet paper/wipes in reach    History of Falls Interventions: Door open when patient unattended       sleep= 6

## 2020-12-26 NOTE — PROGRESS NOTES
Problem: Falls - Risk of  Goal: *Absence of Falls  Description: Document Yuliana Ramos Fall Risk and appropriate interventions in the flowsheet. Outcome: Progressing Towards Goal  Note: Fall Risk Interventions:  Mobility Interventions: Assess mobility with egress test         Medication Interventions: Teach patient to arise slowly    Elimination Interventions: Toilet paper/wipes in reach    History of Falls Interventions: Door open when patient unattended         Problem: Patient Education: Go to Patient Education Activity  Goal: Patient/Family Education  Outcome: Progressing Towards Goal     Problem: Depressed Mood (Adult/Pediatric)  Goal: *STG: Participates in treatment plan  Outcome: Progressing Towards Goal  Note: Pt out on the unit for small periods of time. Little interaction with peers and staff. Ate breakfast and took medication. Less irritable during the shift. Encouraged pt to attend groups and participate.   Goal: Interventions  Outcome: Progressing Towards Goal     Problem: Patient Education: Go to Patient Education Activity  Goal: Patient/Family Education  Outcome: Progressing Towards Goal

## 2020-12-27 PROCEDURE — 74011250637 HC RX REV CODE- 250/637: Performed by: PSYCHIATRY & NEUROLOGY

## 2020-12-27 PROCEDURE — 74011250637 HC RX REV CODE- 250/637: Performed by: NURSE PRACTITIONER

## 2020-12-27 PROCEDURE — 65220000003 HC RM SEMIPRIVATE PSYCH

## 2020-12-27 RX ADMIN — MEDROXYPROGESTERONE ACETATE 10 MG: 10 TABLET ORAL at 08:08

## 2020-12-27 RX ADMIN — BUSPIRONE HYDROCHLORIDE 10 MG: 10 TABLET ORAL at 16:51

## 2020-12-27 RX ADMIN — BUSPIRONE HYDROCHLORIDE 10 MG: 10 TABLET ORAL at 21:11

## 2020-12-27 RX ADMIN — BUSPIRONE HYDROCHLORIDE 10 MG: 10 TABLET ORAL at 08:08

## 2020-12-27 RX ADMIN — ESCITALOPRAM OXALATE 20 MG: 10 TABLET ORAL at 08:08

## 2020-12-27 RX ADMIN — BUPROPION HYDROCHLORIDE 100 MG: 100 TABLET, FILM COATED ORAL at 08:08

## 2020-12-27 RX ADMIN — TRAZODONE HYDROCHLORIDE 50 MG: 50 TABLET ORAL at 21:11

## 2020-12-27 NOTE — PROGRESS NOTES
Problem: Depressed Mood (Adult/Pediatric)  Goal: *STG: Participates in treatment plan  Outcome: Progressing Towards Goal  Patient is more active on the unit and seems brighter; humurous. States she is in some ways better and in some ways worse. Felt she didn't sleep well and the nightmares were of the same intensity, \"I woke up out of breath. I may have only had 4 to 5 hours of sleep\". Positives are that she has had a much more stable diet since she's been here and she is beginning to feel the effects. Admits to making an effort to making more eye contact with people. Ruminates on details that she can't seem to understand, \"It's constantly going over and over and over in my mind and I can't shut it off\". Educated to give the Wellbutrin a few days to work and then treatment team can reassess.   Goal: *STG: Verbalizes anger, guilt, and other feelings in a constructive manor  Outcome: Progressing Towards Goal  Goal: *STG: Demonstrates reduction in symptoms and increase in insight into coping skills/future focused  Outcome: Progressing Towards Goal  Goal: *STG: Complies with medication therapy  Outcome: Progressing Towards Goal  Goal: Interventions  Outcome: Progressing Towards Goal

## 2020-12-27 NOTE — PROGRESS NOTES
Problem: Falls - Risk of  Goal: *Absence of Falls  Description: Document Viji Sahni Fall Risk and appropriate interventions in the flowsheet. Outcome: Progressing Towards Goal  Note: Fall Risk Interventions:  Mobility Interventions: Assess mobility with egress test         Medication Interventions: Teach patient to arise slowly    Elimination Interventions:  Toilet paper/wipes in reach    History of Falls Interventions: Door open when patient unattended

## 2020-12-27 NOTE — BH NOTES
Chief Complaint:  \"OK enough. \"     Interval History:  Patient reports she is doing well in some ways and poorly ion others. Reports she continues to experience nightmares and she obsesses about things she cannot remember. Slept poorly last nioght  Denies SI/HI. Denies AH/VH.        Mental Status Exam:  General Presentation:  Fair grooming; maintains eye contact  Orientation:      Oriented in all spheres  Gait and Station:          No abnormality noted  Musculoskeletal System         : WNL  Language:       No aphasia or dysarthria  Speech:           Regular/normal volume and rate  Thought Processes:     logical  Thought Associations:            goal directed  Thought Content:         Not delusional  Perception: Denies AH/VH  Suicidal Ideations:        Denies  Homicidal Ideations:    Denies  Mood:  OK  Affect:   Constricted  Insight:            Fair  Judgment:       Fair     Assessment and Plan:  Mood disorder unspecified, rule out recurrent major depression, rule out bipolar disorder, rule out substance-induced mood disorder.  Marijuana use disorder, mild    Continue the current medication regimen   Disposition planning to continue. I certify that this patients inpatient psychiatric hospital services furnished since the previous certification were, and continue to be, required for treatment that could reasonably be expected to improve the patient's condition, or for diagnostic study, and that the patient continues to need, on a daily basis, active treatment furnished directly by or requiring the supervision of inpatient psychiatric facility personnel. In addition, the hospital records show that services furnished were intensive treatment services, admission or related services, or equivalent services.      Current Facility-Administered Medications   Medication Dose Route Frequency    buPROPion (WELLBUTRIN) tablet 100 mg  100 mg Oral DAILY    busPIRone (BUSPAR) tablet 10 mg  10 mg Oral TID    nicotine (NICODERM CQ) 21 mg/24 hr patch 1 Patch  1 Patch TransDERmal DAILY    influenza vaccine 2020-21 (6 mos+)(PF) (FLUARIX/FLULAVAL/FLUZONE QUAD) injection 0.5 mL  0.5 mL IntraMUSCular PRIOR TO DISCHARGE    escitalopram oxalate (LEXAPRO) tablet 20 mg  20 mg Oral DAILY    medroxyPROGESTERone (PROVERA) tablet 10 mg  10 mg Oral DAILY       Physical Exam:         Past Medical History:  History reviewed. No pertinent past medical history. Labs:  Lab Results   Component Value Date/Time    WBC 13.3 (H) 12/21/2020 11:26 PM    HGB 14.9 12/21/2020 11:26 PM    HCT 45.5 12/21/2020 11:26 PM    PLATELET 412 56/09/7653 11:26 PM    MCV 88.7 12/21/2020 11:26 PM      Lab Results   Component Value Date/Time    Sodium 137 12/21/2020 11:26 PM    Potassium 3.9 12/21/2020 11:26 PM    Chloride 101 12/21/2020 11:26 PM    CO2 25 12/21/2020 11:26 PM    Anion gap 11 12/21/2020 11:26 PM    Glucose 96 12/21/2020 11:26 PM    BUN 15 12/21/2020 11:26 PM    Creatinine 0.70 12/21/2020 11:26 PM    BUN/Creatinine ratio 21 (H) 12/21/2020 11:26 PM    GFR est AA >60 12/21/2020 11:26 PM    GFR est non-AA >60 12/21/2020 11:26 PM    Calcium 9.5 12/21/2020 11:26 PM    Bilirubin, total 0.8 12/21/2020 11:26 PM    Alk.  phosphatase 33 (L) 12/21/2020 11:26 PM    Protein, total 7.9 12/21/2020 11:26 PM    Albumin 4.2 12/21/2020 11:26 PM    Globulin 3.7 12/21/2020 11:26 PM    A-G Ratio 1.1 12/21/2020 11:26 PM    ALT (SGPT) 13 12/21/2020 11:26 PM      Vitals:    12/26/20 1559 12/26/20 2013 12/27/20 0714 12/27/20 1233   BP: 123/78 112/72 117/77    Pulse: 83 88 68    Resp: 16 16 16    Temp: 98 °F (36.7 °C) 98.6 °F (37 °C) 98.1 °F (36.7 °C)    SpO2: 99% 95% 100%    Weight:    78.1 kg (172 lb 1.6 oz)   Height:       LMP: 12/11/2020

## 2020-12-27 NOTE — PROGRESS NOTES
Problem: Falls - Risk of  Goal: *Absence of Falls  Description: Document Domonique Spann Fall Risk and appropriate interventions in the flowsheet. Outcome: Progressing Towards Goal  Note: Fall Risk Interventions:  Mobility Interventions: Assess mobility with egress test    Medication Interventions: Teach patient to arise slowly    Elimination Interventions:  Toilet paper/wipes in reach    History of Falls Interventions: Door open when patient unattended      Problem: Patient Education: Go to Patient Education Activity  Goal: Patient/Family Education  Outcome: Progressing Towards Goal

## 2020-12-27 NOTE — PROGRESS NOTES
Problem: Depressed Mood (Adult/Pediatric)  Goal: *STG: Participates in treatment plan  Outcome: Progressing Towards Goal  Pt is less isolative. After is brighter. Pt is using humor appropriately. She complies with meds.   2105  Trazadone 50 mg administered per pt request to promote rest.

## 2020-12-27 NOTE — PROGRESS NOTES
Pt appears asleep. No distress noted. Respirations WNL. Will continue to monitor q15 for safety. Problem: Falls - Risk of  Goal: *Absence of Falls  Description: Document Ed Villalta Fall Risk and appropriate interventions in the flowsheet. Outcome: Progressing Towards Goal  Note: Fall Risk Interventions:  Mobility Interventions: Assess mobility with egress test         Medication Interventions: Teach patient to arise slowly    Elimination Interventions:  Toilet paper/wipes in reach    History of Falls Interventions: Door open when patient unattended       sleep= 5

## 2020-12-27 NOTE — BH NOTES
GROUP THERAPY PROGRESS NOTE    Patient is participating in [place]. Group time: 25 minutes    Personal goal for participation: Creating a safety plan for patients in crisis or having suicidal ideation      Goal orientation: Personal    Group therapy participation: active    Therapeutic interventions reviewed and discussed: Group discussion was focused answering the safety plan that [facility] is creating for patients when they discharge. Each question was explained to patients, and they had to fill out the answers individually. At the end, group members were encouraged to share parts of their safety plan or identify one takeaway from completing this. Group discussion included different ways patient could utilize this plan when they leave. Impression of participation: Patient was calm and cooperative. Is still working on safety plan.      Alla Morales, Supervisee in Social Work

## 2020-12-28 VITALS
DIASTOLIC BLOOD PRESSURE: 83 MMHG | TEMPERATURE: 98.1 F | RESPIRATION RATE: 16 BRPM | HEIGHT: 65 IN | BODY MASS INDEX: 28.67 KG/M2 | OXYGEN SATURATION: 99 % | SYSTOLIC BLOOD PRESSURE: 116 MMHG | WEIGHT: 172.1 LBS | HEART RATE: 63 BPM

## 2020-12-28 PROCEDURE — 74011250637 HC RX REV CODE- 250/637: Performed by: NURSE PRACTITIONER

## 2020-12-28 PROCEDURE — 74011250637 HC RX REV CODE- 250/637: Performed by: PSYCHIATRY & NEUROLOGY

## 2020-12-28 RX ORDER — TRAZODONE HYDROCHLORIDE 50 MG/1
50 TABLET ORAL
Qty: 30 TAB | Refills: 0 | Status: SHIPPED | OUTPATIENT
Start: 2020-12-28

## 2020-12-28 RX ORDER — HYDROXYZINE 50 MG/1
50 TABLET, FILM COATED ORAL
Qty: 90 TAB | Refills: 0 | Status: SHIPPED | OUTPATIENT
Start: 2020-12-28 | End: 2021-01-27

## 2020-12-28 RX ORDER — BUSPIRONE HYDROCHLORIDE 10 MG/1
10 TABLET ORAL 3 TIMES DAILY
Qty: 90 TAB | Refills: 0 | Status: SHIPPED | OUTPATIENT
Start: 2020-12-28

## 2020-12-28 RX ORDER — BUPROPION HYDROCHLORIDE 100 MG/1
100 TABLET ORAL 2 TIMES DAILY
Qty: 60 TAB | Refills: 0 | Status: SHIPPED | OUTPATIENT
Start: 2020-12-28

## 2020-12-28 RX ADMIN — MEDROXYPROGESTERONE ACETATE 10 MG: 10 TABLET ORAL at 08:15

## 2020-12-28 RX ADMIN — ESCITALOPRAM OXALATE 20 MG: 10 TABLET ORAL at 08:15

## 2020-12-28 RX ADMIN — BUPROPION HYDROCHLORIDE 100 MG: 100 TABLET, FILM COATED ORAL at 08:15

## 2020-12-28 RX ADMIN — BUSPIRONE HYDROCHLORIDE 10 MG: 10 TABLET ORAL at 08:15

## 2020-12-28 RX ADMIN — HYDROXYZINE HYDROCHLORIDE 50 MG: 50 TABLET, FILM COATED ORAL at 00:18

## 2020-12-28 NOTE — SUICIDE SAFETY PLAN
SAFETY PLAN    A suicide Safety Plan is a document that supports someone when they are having thoughts of suicide. Warning Signs that indicate a suicidal crisis may be developing: What (situations, thoughts, feelings, body sensations, behaviors, etc.) do you experience that lets you know you are beginning to think about suicide? 1. \"I will not attempt suicide. I learned my lesson in 2009. I choose life\"  2.   3. Internal Coping Strategies:  What things can I do (relaxation techniques, hobbies, physical activities, etc.) to take my mind off my problems without contacting another person? 1. Breathing  2. Praying  3. Stop Moving    People and social settings that provide distraction: Who can I call or where can I go to distract me? 1. Name: Raquel Berumen  Phone: Number in my phone  2. Name: Lynne Rosetta  Phone: Number in my phone   3. Place: [place]          4. Place:  Go for a drive    People whom I can ask for help: Who can I call when I need help - for example, friends, family, clergy, someone else? 1. Name: Lakeisha Harrell                Phone: Number in my phone  2. Name: Lynne Sargent  Phone: Number in my phone  3. Name: Agnieszka Do  Phone: Number in my phone    Professionals or [address] [place] [address] I can contact during a crisis: Who can I call for help - for example, my doctor, my psychiatrist, my psychologist, a mental health provider, a suicide hotline? 1. Clinician Name: Dr. Jessica Cervantes   Phone: Number in my phone      Clinician Pager or Emergency Contact #:     2. Clinician Name: Ginette Kelley   Phone: Number in my phone      Clinician Pager or Emergency Contact #:     3. Suicide Prevention Lifeline: 9-271-259-TALK (9295)    4. [address] [place] [address] Emergency Services -  for example, [facility] suicide hotline, [facility] Hotline:       Emergency Services Address: [address] [facility]      Emergency Services Phone:     Making the environment safe:  How can I make my environment (house/apartment/living space) safer? For example, can I remove guns, medications, and other items? 1. No roomies! Except my future  (maybe his dog)  2.

## 2020-12-28 NOTE — BH NOTES
PRN Medication Documentation    Specific patient behavior that led to need for PRN medication: anxiety  Staff interventions attempted prior to PRN being given: therapeutic communication  PRN medication given: Atarax 50 mg PO  Patient response/effectiveness of PRN medication: back to bed.  Will monitor

## 2020-12-28 NOTE — BH NOTES
Behavioral Health Transition Record to Provider    Patient Name: Francesca Steele  YOB: 1980  Medical Record Number: [id number]  Date of Admission: 12/21/2020  Date of Discharge: 12/28/2020    Attending Provider: Shannan Chanel MD  Discharging Provider: Dr. Merino Sample  To contact this individual call [phone] and ask the  to page. If unavailable, ask to be transferred to [facility] Provider on call. [facility] Provider will be available on call 24/7 and during holidays.     Primary Care Provider: None    Allergies   Allergen Reactions    Contrast Agent [Iodine] Nausea and Vomiting       Reason for Admission: The patient is a 23-year-old  female who is currently admitted to the [facility] at [facility] after being transferred to us from the [address] [place] [address] at [facility].  She had presented there initially for an evaluation for abdominal pain and was discharged from the ER.  She came back several hours later and was noted to be behaving abnormally.  When the [employer] counselor tried to assess her over Telemedicine, she was lying down in a fetal position, crying uncontrollably and would not speak to her.  The staff at the ER noted that she was stammering and did not make sense when she talked. Cristina Ortiz had told them that she needed to follow her safety plan, otherwise she would end up dead. Maggie Mohr told the staff that she felt unsafe going back out on the outside of the hospital and she was transferred to us for further management.  She reports that she has been more depressed recently but she again is a poor historian and unable to provide a cogent temporal history.  States that she has been overly stressed recently and has been living in her car or with friends. Jorge Skeltonime that she feels cold every night and cannot bear it anymore.  Since her arrival on the unit, she has been isolative, can be seen sometimes talking to herself and is minimally interactive with staff and peers. Kilpatrick Ingles that she has been taking some of her medications and took 1 Ativan and rescue Adderall in the last few days. Tami James urine drug screen was positive for stimulants and THC, but says she has not used THC in several weeks.  A few days ago, she was started on prednisone for an upper respiratory tract infection and is also on a Z-Rashi for this.  This may have contributed to her symptoms also, but she is not clear about this.  Denies any psychotic symptoms to me.  Would not answer questions about suicidality. Admission Diagnosis: PTSD (post-traumatic stress disorder) [F43.10]  MDD (major depressive disorder) [F32.9]    * No surgery found *    Results for orders placed or performed during the hospital encounter of 12/21/20   URINE CULTURE HOLD SAMPLE    Specimen: Serum; Urine   Result Value Ref Range    Urine culture hold        Urine on hold in Microbiology dept for 2 days. If unpreserved urine is submitted, it cannot be used for addtional testing after 24 hours, recollection will be required. SARS-COV-2, PCR    Specimen: Nasopharyngeal   Result Value Ref Range    Specimen source Nasopharyngeal      SARS-CoV-2 Not detected NOTD     CBC WITH AUTOMATED DIFF   Result Value Ref Range    WBC 13.3 (H) 3.6 - 11.0 K/uL    RBC 5.13 3.80 - 5.20 M/uL    HGB 14.9 11.5 - 16.0 g/dL    HCT 45.5 35.0 - 47.0 %    MCV 88.7 80.0 - 99.0 FL    MCH 29.0 26.0 - 34.0 PG    MCHC 32.7 30.0 - 36.5 g/dL    RDW 13.4 11.5 - 14.5 %    PLATELET 506 502 - 326 K/uL    MPV 9.4 8.9 - 12.9 FL    NRBC 0.0 0  WBC    ABSOLUTE NRBC 0.00 0.00 - 0.01 K/uL    NEUTROPHILS 47 32 - 75 %    LYMPHOCYTES 39 12 - 49 %    MONOCYTES 11 5 - 13 %    EOSINOPHILS 2 0 - 7 %    BASOPHILS 1 0 - 1 %    IMMATURE GRANULOCYTES 0 0.0 - 0.5 %    ABS. NEUTROPHILS 6.2 1.8 - 8.0 K/UL    ABS. LYMPHOCYTES 5.2 (H) 0.8 - 3.5 K/UL    ABS. MONOCYTES 1.5 (H) 0.0 - 1.0 K/UL    ABS. EOSINOPHILS 0.2 0.0 - 0.4 K/UL    ABS. BASOPHILS 0.1 0.0 - 0.1 K/UL    ABS. IMM. GRANS. 0.0 0.00 - 0.04 K/UL    DF AUTOMATED     METABOLIC PANEL, BASIC   Result Value Ref Range    Sodium 137 136 - 145 mmol/L    Potassium 3.9 3.5 - 5.1 mmol/L    Chloride 101 97 - 108 mmol/L    CO2 25 21 - 32 mmol/L    Anion gap 11 5 - 15 mmol/L    Glucose 96 65 - 100 mg/dL    BUN 15 6 - 20 MG/DL    Creatinine 0.70 0.55 - 1.02 MG/DL    BUN/Creatinine ratio 21 (H) 12 - 20      GFR est AA >60 >60 ml/min/1.73m2    GFR est non-AA >60 >60 ml/min/1.73m2    Calcium 9.5 8.5 - 10.1 MG/DL   URINALYSIS W/MICROSCOPIC   Result Value Ref Range    Color YELLOW/STRAW      Appearance CLEAR CLEAR      Specific gravity 1.038 (H) 1.003 - 1.030      pH (UA) 6.0 5.0 - 8.0      Protein Negative NEG mg/dL    Glucose Negative NEG mg/dL    Ketone 40 (A) NEG mg/dL    Blood Negative NEG      Urobilinogen 0.2 0.2 - 1.0 EU/dL    Nitrites Negative NEG      Leukocyte Esterase Negative NEG      WBC 0-4 0 - 4 /hpf    RBC 0-5 0 - 5 /hpf    Epithelial cells FEW FEW /lpf    Bacteria 1+ (A) NEG /hpf   DRUG SCREEN, URINE   Result Value Ref Range    AMPHETAMINES Positive (A) NEG      BARBITURATES Negative NEG      BENZODIAZEPINES Negative NEG      COCAINE Negative NEG      METHADONE Negative NEG      OPIATES Negative NEG      PCP(PHENCYCLIDINE) Negative NEG      THC (TH-CANNABINOL) Positive (A) NEG      Drug screen comment (NOTE)    HEPATIC FUNCTION PANEL   Result Value Ref Range    Protein, total 7.9 6.4 - 8.2 g/dL    Albumin 4.2 3.5 - 5.0 g/dL    Globulin 3.7 2.0 - 4.0 g/dL    A-G Ratio 1.1 1.1 - 2.2      Bilirubin, total 0.8 0.2 - 1.0 MG/DL    Bilirubin, direct 0.2 0.0 - 0.2 MG/DL    Alk.  phosphatase 33 (L) 45 - 117 U/L    AST (SGOT) 12 (L) 15 - 37 U/L    ALT (SGPT) 13 12 - 78 U/L   ETHYL ALCOHOL   Result Value Ref Range    ALCOHOL(ETHYL),SERUM <71 <53 MG/DL   SALICYLATE   Result Value Ref Range    Salicylate level 4.2 2.8 - 20.0 MG/DL   ACETAMINOPHEN   Result Value Ref Range    Acetaminophen level <2 (L) 10 - 30 ug/mL   SARS-COV-2   Result Value Ref Range    COVID-19 rapid test Not detected NOTD      Specimen type NP Swab      Health status Symptomatic Testing     BILIRUBIN, CONFIRM   Result Value Ref Range    Bilirubin UA, confirm Negative NEG         Immunizations administered during this encounter: There is no immunization history for the selected administration types on file for this patient. Screening for Metabolic Disorders for Patients on Antipsychotic Medications  (Data obtained from the EMR)    Estimated Body Mass Index  Estimated body mass index is 29.08 kg/m² as calculated from the following:    Height as of this encounter: 5' 4.5\" (1.638 m). Weight as of this encounter: 78.1 kg (172 lb 1.6 oz). Vital Signs/Blood Pressure  Visit Vitals  /83   Pulse 63   Temp 98.1 °F (36.7 °C)   Resp 16   Ht 5' 4.5\" (1.638 m)   Wt 78.1 kg (172 lb 1.6 oz)   LMP 12/11/2020 (Approximate)   SpO2 99%   BMI 29.08 kg/m²       Blood Glucose/Hemoglobin A1c  Lab Results   Component Value Date/Time    Glucose 96 12/21/2020 11:26 PM       No results found for: HBA1C, HGBE8, IKL8HPIY     Lipid Panel  No results found for: CHOL, CHOLX, CHLST, CHOLV, 208471, HDL, HDLP, LDL, LDLC, DLDLP, TGLX, TRIGL, TRIGP, CHHD, CHHDX     Discharge Diagnosis: Mood disorder unspecified, rule out recurrent major depression, rule out bipolar disorder, rule out substance-induced mood disorder.  Marijuana use disorder, mild. Discharge Plan: The patient Brennen Joshi exhibits the ability to control behavior in a less restrictive environment. Patient's level of functioning is improving. No assaultive/destructive behavior has been observed for the past 24 hours. No suicidal/homicidal threat or behavior has been observed for the past 24 hours. There is no evidence of serious medication side effects. Patient has not been in physical or protective restraints for at least the past 24 hours. If weapons involved, how are they secured?  NA    Is patient aware of and in agreement with discharge plan? yes    Arrangements for medication:  Prescriptions given to patient, given a weeks supply or 30 day supply. Copy of discharge instructions to provider?:  [address], Dr. Cayla Shaw for transportation home:  [employer] [facility]    Keep all follow up appointments as scheduled, continue to take prescribed medications per physician instructions. Mental health crisis number:  457 or [address] [phone]      Discharge Medication List and Instructions:   Current Discharge Medication List      START taking these medications    Details   buPROPion (WELLBUTRIN) 100 mg tablet Take 1 Tab by mouth two (2) times a day. Indications: major depressive disorder  Qty: 60 Tab, Refills: 0      busPIRone (BUSPAR) 10 mg tablet Take 1 Tab by mouth three (3) times daily. Indications: repeated episodes of anxiety  Qty: 90 Tab, Refills: 0      hydrOXYzine HCL (ATARAX) 50 mg tablet Take 1 Tab by mouth three (3) times daily as needed for Anxiety for up to 30 days. Indications: anxious  Qty: 90 Tab, Refills: 0      traZODone (DESYREL) 50 mg tablet Take 1 Tab by mouth nightly as needed for Sleep (For insomnia). Indications: insomnia associated with depression  Qty: 30 Tab, Refills: 0         CONTINUE these medications which have NOT CHANGED    Details   medroxyPROGESTERone (PROVERA) 10 mg tablet Take 10 mg by mouth daily. escitalopram oxalate (LEXAPRO) 20 mg tablet Take 20 mg by mouth daily.          STOP taking these medications       ondansetron hcl (ZOFRAN) 4 mg tablet Comments:   Reason for Stopping:         dextroamphetamine-amphetamine (ADDERALL) 20 mg tablet Comments:   Reason for Stopping:         LORazepam (ATIVAN) 1 mg tablet Comments:   Reason for Stopping:         azithromycin (Zithromax Z-Rashi) 250 mg tablet Comments:   Reason for Stopping:         predniSONE (DELTASONE) 20 mg tablet Comments:   Reason for Stopping:         phenazopyridine (Pyridium) 200 mg tablet Comments:   Reason for Stopping:         benzonatate (Tessalon Perles) 100 mg capsule Comments:   Reason for Stopping:               Unresulted Labs (24h ago, onward)    None        To obtain results of studies pending at discharge, please contact [phone]    Follow-up Information     Follow up With Specialties Details Why [address] on 12/28/2020  Ms Ricky Harrison  [phone]  FAX: [phone]    Dr. Florencio Cockayne an appointment as soon as possible for a visit  [address] [place] [address]  [phone]    None    None (395) Patient stated that they have no PCP            Advanced Directive:   Does the patient have an appointed surrogate decision maker? No  Does the patient have a Medical Advance Directive? No  Does the patient have a Psychiatric Advance Directive? No  If the patient does not have a surrogate or Medical Advance Directive AND Psychiatric Advance Directive, the patient was offered information on these advance directives Patient declined to complete    Patient Instructions: Please continue all medications until otherwise directed by physician. Tobacco Cessation Discharge Plan:   Is the patient a smoker and needs referral for smoking cessation? Yes  Patient referred to the following for smoking cessation with an appointment? Refused     Patient was offered medication to assist with smoking cessation at discharge? No  Was education for smoking cessation added to the discharge instructions? Yes    Alcohol/Substance Abuse Discharge Plan:   Does the patient have a history of substance/alcohol abuse and requires a referral for treatment? Yes  Patient referred to the following for substance/alcohol abuse treatment with an appointment? Refused  Patient was offered medication to assist with alcohol cessation at discharge? No  Was education for substance/alcohol abuse added to discharge instructions?  Yes    Patient discharged to Home; discussed with patient/caregiver and provided to the patient/caregiver either in hard copy or electronically.

## 2020-12-28 NOTE — PROGRESS NOTES
Problem: Depressed Mood (Adult/Pediatric)  Goal: *STG: Participates in treatment plan  Outcome: Progressing Towards Goal  Goal: *STG: Verbalizes anger, guilt, and other feelings in a constructive manor  Outcome: Progressing Towards Goal  Goal: *STG: Attends activities and groups  Outcome: Progressing Towards Goal  Goal: *STG: Demonstrates reduction in symptoms and increase in insight into coping skills/future focused  Outcome: Progressing Towards Goal  Goal: *STG: Complies with medication therapy  Outcome: Progressing Towards Goal

## 2020-12-28 NOTE — INTERDISCIPLINARY ROUNDS
Behavioral Health Interdisciplinary Rounds Patient Name: Eric April  Age: 45 y.o. Room/Bed:  8/ Primary Diagnosis: <principal problem not specified> Admission Status: Voluntary Readmission within 30 days: no 
Power of  in place: 
Patient requires a blocked bed: no          Reason for blocked bed:  
Sleep hours:  7.5 Participation in Care/Groups:  yes Medication Compliant?: Yes PRNS (last 24 hours): Antianxiety and Sleep Aid Restraints (last 24 hours):  no 
  
Alcohol screening (AUDIT) completed -  AUDIT Score: 0  If applicable, date SBIRT discussed in treatment team AND documented:   
Tobacco - patient is a smoker: Have You Used Tobacco in the Past 30 Days: Yes Illegal Drugs use: Have You Used Any Illegal Substances Over the Past 12 Months: Yes 
 
24 hour chart check complete: yes Patient goal(s) for today:  
Treatment team focus/goals:  
Progress note Family Contact information:  
Patient's preferred phone number for follow up call :  
Patient's preferred e-mail address : 
LOS:  6  Expected LOS:  
 
Participating treatment team members: Eric April, * (assigned SW),

## 2020-12-28 NOTE — PROGRESS NOTES
Problem: Falls - Risk of  Goal: *Absence of Falls  Description: Document Kerwin Pederson Fall Risk and appropriate interventions in the flowsheet. 12/28/2020 0004 by Tommie Puckett  Outcome: Progressing Towards Goal  Note: Fall Risk Interventions:  Mobility Interventions: Assess mobility with egress test         Medication Interventions: Teach patient to arise slowly    Elimination Interventions: Toilet paper/wipes in reach    History of Falls Interventions: Door open when patient unattended      12/27/2020 1632 by Tommie Salina  Outcome: Progressing Towards Goal  Note: Fall Risk Interventions:  Mobility Interventions: Assess mobility with egress test         Medication Interventions: Teach patient to arise slowly    Elimination Interventions: Toilet paper/wipes in reach    History of Falls Interventions: Door open when patient unattended    Patient received, appears to be asleep, eyes closed, breathing even and unlabored. No signs of distress noted. Will continue to monitor for safety.

## 2020-12-28 NOTE — BH NOTES
PRN Medication Documentation    Specific patient behavior that led to need for PRN medication: insomnia  Staff interventions attempted prior to PRN being given:relaxation  PRN medication given: Trazadone  50 mg PO  Patient response/effectiveness of PRN medication:went to bed, will monitor
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
## 2020-12-29 NOTE — DISCHARGE INSTRUCTIONS
DISCHARGE SUMMARY    NAME:Debbie Barrera  : 1980  MRN: [id number]    The patient Sola Carr exhibits the ability to control behavior in a less restrictive environment. Patient's level of functioning is improving. No assaultive/destructive behavior has been observed for the past 24 hours. No suicidal/homicidal threat or behavior has been observed for the past 24 hours. There is no evidence of serious medication side effects. Patient has not been in physical or protective restraints for at least the past 24 hours. If weapons involved, how are they secured? NA    Is patient aware of and in agreement with discharge plan? ***    Arrangements for medication:  Prescriptions given to patient, given a weeks supply or 30 day supply. Copy of discharge instructions to provider?:  [id number] for transportation home:  [place] [address] all follow up appointments as scheduled, continue to take prescribed medications per physician instructions. Mental health crisis number:  543 or [facility] - [phone]        DISCHARGE SUMMARY from Nurse    PATIENT INSTRUCTIONS:    After general anesthesia or intravenous sedation, for 24 hours or while taking prescription Narcotics:  · Limit your activities  · Do not drive and operate hazardous machinery  · Do not make important personal or business decisions  · Do  not drink alcoholic beverages  · If you have not urinated within 8 hours after discharge, please contact your surgeon on call.     Report the following to your surgeon:  · Excessive pain, swelling, redness or odor of or around the surgical area  · Temperature over 100.5  · Nausea and vomiting lasting longer than 4 hours or if unable to take medications  · Any signs of decreased circulation or nerve impairment to extremity: change in color, persistent  numbness, tingling, coldness or increase pain  · Any questions    What to do at Home:  Recommended activity: Activity as tolerated,         *  Please give a list of your current medications to your Primary Care Provider. *  Please update this list whenever your medications are discontinued, doses are      changed, or new medications (including over-the-counter products) are added. *  Please carry medication information at all times in case of emergency situations. These are general instructions for a healthy lifestyle:    No smoking/ No tobacco products/ Avoid exposure to second hand smoke  Surgeon General's Warning:  Quitting smoking now greatly reduces serious risk to your health. Obesity, smoking, and sedentary lifestyle greatly increases your risk for illness    A healthy diet, regular physical exercise & weight monitoring are important for maintaining a healthy lifestyle    You may be retaining fluid if you have a history of heart failure or if you experience any of the following symptoms:  Weight gain of 3 pounds or more overnight or 5 pounds in a week, increased swelling in our hands or feet or shortness of breath while lying flat in bed. Please call your doctor as soon as you notice any of these symptoms; do not wait until your next office visit. The discharge information has been reviewed with the {PATIENT PARENT GUARDIAN:42271}. The {PATIENT PARENT GUARDIAN:80426} verbalized understanding. Discharge medications reviewed with the {[employer] meds reviewed NSNR:71001} and appropriate educational materials and side effects teaching were provided.   ___________________________________________________________________________________________________________________________________

## 2021-01-04 NOTE — PROGRESS NOTES
Reached out to patient at [phone] for follow up. Patient indicated she has connected with her follow up providers.

## 2021-08-21 ENCOUNTER — HOSPITAL ENCOUNTER (EMERGENCY)
Age: 41
Discharge: HOME | End: 2021-08-22
Attending: EMERGENCY MEDICINE
Payer: COMMERCIAL

## 2021-08-21 DIAGNOSIS — F32.A DEPRESSION, UNSPECIFIED DEPRESSION TYPE: Primary | ICD-10-CM

## 2021-08-21 PROCEDURE — 99281 EMR DPT VST MAYX REQ PHY/QHP: CPT

## 2021-08-22 VITALS
DIASTOLIC BLOOD PRESSURE: 88 MMHG | RESPIRATION RATE: 16 BRPM | HEART RATE: 95 BPM | TEMPERATURE: 98.6 F | SYSTOLIC BLOOD PRESSURE: 139 MMHG | OXYGEN SATURATION: 99 %

## 2021-08-22 NOTE — ED NOTES
Patient refused all medical evaluation and care, patient was escorted out by D, where she preceded to lay on the sidewalk outside.

## 2021-08-22 NOTE — ED TRIAGE NOTES
Triage: Pt arrives ambulatory from home with CC feeling suicidal and also wanting her IUD out. [facility] or remove her IUD but she [facility] would do it for her\". She endorses previous psych admissions. Does not answer when asked if she has a plan for suicide.

## 2021-08-22 NOTE — BSMART NOTE
Pt is a 38 y/o female who arrives at the ED as a walk-in with several bags. She reportedly endorsed SI when speaking with triage but would not elaborate. Triage also noted that pt \"stated she also wanted her IUD removed [facility] and they would not place her on psych or remove her IUD but she \"'knew [facility] would do it. '\" ED physician reports pt was mumbling and would not answer his questions during clinical interview attempt. When writer attempted to meet with pt f/f  in triage she was observed with her eyes closed, sitting sideways with her legs up on the chair. She was unresponsive when writer attempted to talk with her. When she did speak, she was mumbling unintelligibly. She refused to open her eyes. She would not answer writer's questions. She stated, \"I want to talk with a .\" When writer told her a  can be requested, but meanwhile we need her to participate in clinical interview. She refused and stated, \"I don't want to do this process. I don't need to do this. \" She refused to elaborate further then adamantly refused BSMART assessment. Charge nurse returned to pt's room with writer and attempted to engage her further. Pt again refused to talk, then began yelling expletives. Writer unable to obtain any clinical information thus there is no basis to pursue CSB involvement at this time. ED physician will discharge pt.

## 2021-08-22 NOTE — ED PROVIDER NOTES
History of depression. She reports needing to be on the mental health unit. She is refusing to answer any other further questions for me. She apparently was seen at [facility] earlier today. No past medical history on file. No past surgical history on file. No family history on file. Social History     Socioeconomic History    Marital status: SINGLE     Spouse name: Not on file    Number of children: Not on file    Years of education: Not on file    Highest education level: Not on file   Occupational History    Not on file   Tobacco Use    Smoking status: Current Every Day Smoker    Smokeless tobacco: Never Used   Substance and Sexual Activity    Alcohol use: No    Drug use: Yes     Types: Marijuana    Sexual activity: Not on file   Other Topics Concern     Service Not Asked    Blood Transfusions Not Asked    Caffeine Concern Not Asked    Occupational Exposure Not Asked    Hobby Hazards Not Asked    Sleep Concern Not Asked    Stress Concern Not Asked    Weight Concern Not Asked    Special Diet Not Asked    Back Care Not Asked    Exercise Not Asked    Bike Helmet Not Asked   [address] Not Asked    Self-Exams Not Asked   Social History Narrative    Not on file     Social Determinants of Health     Financial Resource Strain:     Difficulty of Paying Living Expenses:    Food Insecurity:     Worried About Running Out of Food in the Last Year:     Ran Out of Food in the Last Year:    Transportation Needs:     Lack of Transportation (Medical):      Lack of Transportation (Non-Medical):    Physical Activity:     Days of Exercise per Week:     Minutes of Exercise per Session:    Stress:     Feeling of Stress :    Social Connections:     Frequency of Communication with Friends and Family:     Frequency of Social Gatherings with Friends and Family:     Attends Mandaeism Services:     Active Member of Clubs or Organizations:     Attends Club or Organization Meetings:     Marital Status:    Intimate Partner Violence:     Fear of Current or Ex-Partner:     Emotionally Abused:     Physically Abused:     Sexually Abused: ALLERGIES: Contrast agent [iodine]    Review of Systems   Unable to perform ROS: Psychiatric disorder       Vitals:    08/21/21 2048   BP: 139/88   Pulse: 95   Resp: 16   Temp: 98.6 °F (37 °C)   SpO2: 99%            Physical Exam  Vitals and nursing note reviewed. Constitutional:       Appearance: She is well-developed. Comments: Refusing to answer questions. Eyes closed. HENT:      Head: Normocephalic and atraumatic. Eyes:      Conjunctiva/sclera: Conjunctivae normal.   Neck:      Trachea: No tracheal deviation. Cardiovascular:      Rate and Rhythm: Normal rate and regular rhythm. Heart sounds: Normal heart sounds. No murmur heard. No friction rub. No gallop. Pulmonary:      Effort: Pulmonary effort is normal.      Breath sounds: Normal breath sounds. Abdominal:      Palpations: Abdomen is soft. Tenderness: There is no abdominal tenderness. Musculoskeletal:         General: No deformity. Cervical back: Neck supple. Skin:     General: Skin is warm and dry. Neurological:      Mental Status: She is alert. [facility]       Procedures    Assessment/plan: Presented for psychiatric help but refused to answer any questions. She became belligerent when told she would be discharged. Her [facility] follow-up. Stable vital signs.   Ondina Sneed MD  12:10 AM

## 2021-08-27 VITALS
TEMPERATURE: 98.4 F | HEART RATE: 90 BPM | RESPIRATION RATE: 16 BRPM | WEIGHT: 168.65 LBS | BODY MASS INDEX: 28.1 KG/M2 | DIASTOLIC BLOOD PRESSURE: 74 MMHG | HEIGHT: 65 IN | SYSTOLIC BLOOD PRESSURE: 133 MMHG | OXYGEN SATURATION: 97 %

## 2021-08-27 PROCEDURE — 99283 EMERGENCY DEPT VISIT LOW MDM: CPT

## 2021-08-28 ENCOUNTER — HOSPITAL ENCOUNTER (EMERGENCY)
Age: 41
Discharge: HOME | End: 2021-08-28
Attending: EMERGENCY MEDICINE
Payer: COMMERCIAL

## 2021-08-28 DIAGNOSIS — Z59.00 HOMELESSNESS: Primary | ICD-10-CM

## 2021-08-28 DIAGNOSIS — M79.671 PAIN IN BOTH FEET: ICD-10-CM

## 2021-08-28 DIAGNOSIS — M79.672 PAIN IN BOTH FEET: ICD-10-CM

## 2021-08-28 PROCEDURE — 74011000250 HC RX REV CODE- 250

## 2021-08-28 RX ORDER — BACITRACIN 500 UNIT/G
PACKET (EA) TOPICAL
Status: COMPLETED
Start: 2021-08-28 | End: 2021-08-28

## 2021-08-28 RX ORDER — BACITRACIN 500 UNIT/G
1 PACKET (EA) TOPICAL 3 TIMES DAILY
Status: DISCONTINUED | OUTPATIENT
Start: 2021-08-28 | End: 2021-08-28 | Stop reason: HOSPADM

## 2021-08-28 RX ADMIN — BACITRACIN 1 PACKET: 500 OINTMENT TOPICAL at 01:09

## 2021-08-28 SDOH — ECONOMIC STABILITY - HOUSING INSECURITY: HOMELESSNESS UNSPECIFIED: Z59.00

## 2021-08-28 NOTE — ED PROVIDER NOTES
28-year-old homeless female presents today with diffuse bilateral foot pain (plantar aspect) after walking on the streets barefoot for the last few days. She is requesting to have her feet \"sanitized\". No past medical history on file. No past surgical history on file. No family history on file. Social History     Socioeconomic History    Marital status: SINGLE     Spouse name: Not on file    Number of children: Not on file    Years of education: Not on file    Highest education level: Not on file   Occupational History    Not on file   Tobacco Use    Smoking status: Current Every Day Smoker    Smokeless tobacco: Never Used   Substance and Sexual Activity    Alcohol use: No    Drug use: Yes     Types: Marijuana    Sexual activity: Not on file   Other Topics Concern     Service Not Asked    Blood Transfusions Not Asked    Caffeine Concern Not Asked    Occupational Exposure Not Asked    Hobby Hazards Not Asked    Sleep Concern Not Asked    Stress Concern Not Asked    Weight Concern Not Asked    Special Diet Not Asked    Back Care Not Asked    Exercise Not Asked    Bike Helmet Not Asked   [address] Not Asked    Self-Exams Not Asked   Social History Narrative    Not on file     Social Determinants of Health     Financial Resource Strain:     Difficulty of Paying Living Expenses:    Food Insecurity:     Worried About Running Out of Food in the Last Year:     Ran Out of Food in the Last Year:    Transportation Needs:     Lack of Transportation (Medical):      Lack of Transportation (Non-Medical):    Physical Activity:     Days of Exercise per Week:     Minutes of Exercise per Session:    Stress:     Feeling of Stress :    Social Connections:     Frequency of Communication with Friends and Family:     Frequency of Social Gatherings with Friends and Family:     Attends Scientologist Services:     Active Member of Clubs or Organizations:     Attends Club or Organization Meetings:     Marital Status:    Intimate Partner Violence:     Fear of Current or Ex-Partner:     Emotionally Abused:     Physically Abused:     Sexually Abused: ALLERGIES: Contrast agent [iodine]    Review of Systems   Constitutional: Negative for fever. HENT: Negative for sore throat. Eyes: Negative for visual disturbance. Respiratory: Negative for shortness of breath. Cardiovascular: Negative for palpitations. Gastrointestinal: Negative for vomiting. Genitourinary: Negative for dysuria. Musculoskeletal: Negative for myalgias. Skin: Positive for wound. Neurological: Negative for dizziness. Psychiatric/Behavioral: Negative for dysphoric mood. Vitals:    08/27/21 2349   BP: 133/74   Pulse: 90   Resp: 16   Temp: 98.4 °F (36.9 °C)   SpO2: 97%   Weight: 76.5 kg (168 lb 10.4 oz)   Height: 5' 4.5\" (1.638 m)            Physical Exam  Vitals and nursing note reviewed. Constitutional:       General: She is not in acute distress. Appearance: Normal appearance. She is not ill-appearing. HENT:      Head: Normocephalic. Nose: Nose normal.   Eyes:      General: No scleral icterus. Extraocular Movements: Extraocular movements intact. Cardiovascular:      Rate and Rhythm: Normal rate and regular rhythm. Pulmonary:      Effort: Pulmonary effort is normal.   Abdominal:      General: There is no distension. Tenderness: There is no guarding. Musculoskeletal:         General: Normal range of motion. Cervical back: Normal range of motion and neck supple. Skin:     General: Skin is warm and dry. Comments: Blister on left lateral foot   Neurological:      Mental Status: She is alert and oriented to person, place, and time. Mental status is at baseline. Psychiatric:         Mood and Affect: Mood normal.         Behavior: Behavior is withdrawn. Thought Content:  Thought content normal.         Judgment: Judgment normal.          MDM VITAL SIGNS:  Patient Vitals for the past 4 hrs:   Temp Pulse Resp BP SpO2   08/27/21 2349 98.4 °F (36.9 °C) 90 16 133/74 97 %         LABS:  No results found for this or any previous visit (from the past 6 hour(s)). IMAGING:  No orders to display         Medications During Visit:  Medications   bacitracin 500 unit/gram packet 1 Packet (has no administration in time range)         DECISION MAKING:  Mary Spaulding is a 37 y.o. female who comes in as above. Patient will be given bacitracin ointment for small blister and discharged with socks. IMPRESSION:  1. Homelessness    2. Pain in both feet        DISPOSITION:  Discharged      Current Discharge Medication List           Follow-up Information    None           The patient is asked to follow-up with their primary care provider in the next several days. They are to call tomorrow for an appointment. The patient is asked to return promptly for any increased concerns or worsening of symptoms. They can return to this emergency department or any other emergency department.       Procedures

## 2021-08-28 NOTE — ED TRIAGE NOTES
Pt arrives ambulatory to ED w/ CC foot pain. Reports she has been living on the streets the past week and has been walking barefoot. 3 blisters present.

## 2021-12-05 ENCOUNTER — HOSPITAL ENCOUNTER (EMERGENCY)
Age: 41
Discharge: HOME | End: 2021-12-05
Attending: EMERGENCY MEDICINE
Payer: COMMERCIAL

## 2021-12-05 VITALS
OXYGEN SATURATION: 98 % | DIASTOLIC BLOOD PRESSURE: 75 MMHG | SYSTOLIC BLOOD PRESSURE: 124 MMHG | HEART RATE: 73 BPM | HEIGHT: 65 IN | TEMPERATURE: 97.6 F | RESPIRATION RATE: 15 BRPM | BODY MASS INDEX: 27.49 KG/M2 | WEIGHT: 165 LBS

## 2021-12-05 DIAGNOSIS — Z76.0 MEDICATION REFILL: Primary | ICD-10-CM

## 2021-12-05 PROCEDURE — 99281 EMR DPT VST MAYX REQ PHY/QHP: CPT

## 2021-12-05 RX ORDER — DEXTROAMPHETAMINE SACCHARATE, AMPHETAMINE ASPARTATE, DEXTROAMPHETAMINE SULFATE AND AMPHETAMINE SULFATE 5; 5; 5; 5 MG/1; MG/1; MG/1; MG/1
20 TABLET ORAL DAILY
Qty: 20 TABLET | Refills: 0 | Status: SHIPPED | OUTPATIENT
Start: 2021-12-05

## 2021-12-05 NOTE — ED NOTES
Pt ambulatory to ED with c/o being out of Adderall 20 mg tid for 7-10 days and states \"I feel like I'm going to have a panic attack and I'm homeless\".

## 2021-12-06 ENCOUNTER — HOSPITAL ENCOUNTER (EMERGENCY)
Age: 41
Discharge: HOME | End: 2021-12-06
Attending: EMERGENCY MEDICINE | Admitting: EMERGENCY MEDICINE
Payer: COMMERCIAL

## 2021-12-06 VITALS
SYSTOLIC BLOOD PRESSURE: 118 MMHG | DIASTOLIC BLOOD PRESSURE: 84 MMHG | HEART RATE: 80 BPM | RESPIRATION RATE: 18 BRPM | TEMPERATURE: 98.5 F | BODY MASS INDEX: 28.32 KG/M2 | HEIGHT: 64 IN | OXYGEN SATURATION: 98 %

## 2021-12-06 DIAGNOSIS — Z76.5 DRUG-SEEKING BEHAVIOR: Primary | ICD-10-CM

## 2021-12-06 PROCEDURE — 99283 EMERGENCY DEPT VISIT LOW MDM: CPT

## 2021-12-06 NOTE — ED TRIAGE NOTES
Pt reports she was seen here yesterday for a medication refill, we refilled her medication but it was written for once daily and she takes it 3 times daily. Pt is tearful and is asking for help. Pt is homeless.

## 2021-12-06 NOTE — ED PROVIDER NOTES
43-year-old female with past medical history of homelessness and anxiety presents the ER today for medication refill. Patient states that she ran out of her prescribed Adderall few days ago which has been exacerbating her symptoms of anxiety. She does not currently have a psychiatrist.  She reports being on the Adderall for greater than 2 years.  review shows a 30-day course of Adderall that was filled at the end of October 2021. History reviewed. No pertinent past medical history. History reviewed. No pertinent surgical history. History reviewed. No pertinent family history. Social History     Socioeconomic History    Marital status: SINGLE     Spouse name: Not on file    Number of children: Not on file    Years of education: Not on file    Highest education level: Not on file   Occupational History    Not on file   Tobacco Use    Smoking status: Current Every Day Smoker    Smokeless tobacco: Never Used   Substance and Sexual Activity    Alcohol use: No    Drug use: Yes     Types: Marijuana    Sexual activity: Not on file   Other Topics Concern     Service Not Asked    Blood Transfusions Not Asked    Caffeine Concern Not Asked    Occupational Exposure Not Asked    Hobby Hazards Not Asked    Sleep Concern Not Asked    Stress Concern Not Asked    Weight Concern Not Asked    Special Diet Not Asked    Back Care Not Asked    Exercise Not Asked    Bike Helmet Not Asked   [address] [facility] Not Asked    Self-Exams Not Asked   Social History Narrative    Not on file     Social Determinants of Health     Financial Resource Strain:     Difficulty of Paying Living Expenses: Not on file   Food Insecurity:     Worried About Running Out of Food in the Last Year: Not on file    Gurdeep of Food in the Last Year: Not on file   Transportation Needs:     Lack of Transportation (Medical): Not on file    Lack of Transportation (Non-Medical):  Not on file   Physical Activity:     Days of Exercise per Week: Not on file    Minutes of Exercise per Session: Not on file   Stress:     Feeling of Stress : Not on file   Social Connections:     Frequency of Communication with Friends and Family: Not on file    Frequency of Social Gatherings with Friends and Family: Not on file    Attends Latter-day Services: Not on file    Active Member of [address] [place] [address] [employer] or Organizations: Not on file    Attends Club or Organization Meetings: Not on file    Marital Status: Not on file   Intimate Partner Violence:     Fear of Current or Ex-Partner: Not on file    Emotionally Abused: Not on file    Physically Abused: Not on file    Sexually Abused: Not on file   Housing Stability:     Unable to Pay for Housing in the Last Year: Not on file    Number of [place] in the Last Year: Not on file    Unstable Housing in the Last Year: Not on file         ALLERGIES: Contrast agent [iodine]    Review of Systems   Constitutional: Negative for fever. HENT: Negative for sore throat. Eyes: Negative for visual disturbance. Respiratory: Negative for shortness of breath. Cardiovascular: Negative for palpitations. Gastrointestinal: Negative for vomiting. Genitourinary: Negative for dysuria. Musculoskeletal: Negative for myalgias. Skin: Negative for rash. Neurological: Negative for dizziness. Psychiatric/Behavioral: Positive for dysphoric mood. The patient is nervous/anxious. Vitals:    12/05/21 1832   BP: 124/75   Pulse: 73   Resp: 15   Temp: 97.6 °F (36.4 °C)   SpO2: 98%   Weight: 74.8 kg (165 lb)   Height: 5' 4.5\" (1.638 m)            Physical Exam  Vitals and nursing note reviewed. Constitutional:       General: She is not in acute distress. Appearance: Normal appearance. She is not ill-appearing. HENT:      Head: Normocephalic. Nose: Nose normal.   Eyes:      General: No scleral icterus. Extraocular Movements: Extraocular movements intact.    Cardiovascular:      Rate and Rhythm: Normal rate and regular rhythm. Pulmonary:      Effort: Pulmonary effort is normal.   Abdominal:      General: There is no distension. Tenderness: There is no guarding. Musculoskeletal:         General: Normal range of motion. Cervical back: Normal range of motion and neck supple. Skin:     General: Skin is warm and dry. Neurological:      Mental Status: She is alert and oriented to person, place, and time. Mental status is at baseline. Psychiatric:         Mood and Affect: Mood is anxious. Behavior: Behavior is withdrawn. Thought Content: Thought content normal.         Judgment: Judgment normal.          MDM      VITAL SIGNS:  Patient Vitals for the past 4 hrs:   Temp Pulse Resp BP SpO2   12/05/21 1832 97.6 °F (36.4 °C) 73 15 124/75 98 %         LABS:  No results found for this or any previous visit (from the past 6 hour(s)). IMAGING:  No orders to display         Medications During Visit:  Medications - No data to display      DECISION MAKING:  Pia Lockwood is a 37 y.o. female who comes in as above. Patient will be given a short course of her prescribed Adderall and was given information to follow-up with [facility] services. The clinical decision making for this encounter included ordering and interpreting the above diagnostic tests with comparison to prior studies that are within our EMR. Past medical and surgical histories were reviewed, as were records from recent outpatient and emergency department visits. The above results discussed and reviewed with the patient. Patient verbalized understanding of the care plan, including any changes to current outpatient medication regimen, discussed disease process, symptom control, and follow-up care. Return precautions reviewed. IMPRESSION:  1.  Medication refill        DISPOSITION:  Discharged      Current Discharge Medication List      START taking these medications    Details dextroamphetamine-amphetamine (ADDERALL) 20 mg tablet Take 1 Tablet by mouth daily. Max Daily Amount: 20 mg.  Qty: 20 Tablet, Refills: 0  Start date: 12/5/2021    Associated Diagnoses: Medication refill              Follow-up Information     Follow up With Specialties Details Why [facility]                The patient is asked to follow-up with their primary care provider in the next several days. They are to call tomorrow for an appointment. The patient is asked to return promptly for any increased concerns or worsening of symptoms. They can return to this emergency department or any other emergency department.       Procedures

## 2021-12-06 NOTE — BSMART NOTE
Pt back to ER today after receiving stimulant RX last night stating it was \"one third less than what I take. \" Pt seen with attending NP. Pt shared she is currently homeless and she needed her medication because of the sirens and horns blowing. Pt  was given opportunity to begin discussion about Homeless resources/Street sheet and CSUs but she escalated and walked out stating \"fuck you. .. fuck you\" leaving the Emergency Room. It should be noted that provider that saw pt last night was given outpatient resources to give pt to [address] [place] [address]/Same Day Access and Homeless resources.

## 2021-12-06 NOTE — ED PROVIDER NOTES
HPI patient is a 31-year-old homeless female who returns to the ED asking for her Adderall prescription that she was provided with last night be increased to 3 times a day as she previously was prescribed. When she was told that no new prescription was going to be written and is there any other way that we could help her. She started cursing and left the ED. Bsmart counselor was present in the room to offer other resources but the patient became more belligerent and walked out. History reviewed. No pertinent past medical history. History reviewed. No pertinent surgical history. History reviewed. No pertinent family history.     Social History     Socioeconomic History    Marital status: SINGLE     Spouse name: Not on file    Number of children: Not on file    Years of education: Not on file    Highest education level: Not on file   Occupational History    Not on file   Tobacco Use    Smoking status: Former Smoker     Quit date: 2021     Years since quittin.3    Smokeless tobacco: Never Used   Substance and Sexual Activity    Alcohol use: No    Drug use: Not Currently     Types: Marijuana    Sexual activity: Not on file   Other Topics Concern   [address] Service Not Asked    Blood Transfusions Not Asked    Caffeine Concern Not Asked    Occupational Exposure Not Asked    Hobby Hazards Not Asked    Sleep Concern Not Asked    Stress Concern Not Asked    Weight Concern Not Asked    Special Diet Not Asked    Back Care Not Asked    Exercise Not Asked    Bike Helmet Not Asked    [address] Not Asked    Self-Exams Not Asked   Social History Narrative    Not on file     Social Determinants of Health     Financial Resource Strain:     Difficulty of Paying Living Expenses: Not on file   Food Insecurity:     Worried About Running Out of Food in the Last Year: Not on file    Gurdeep of Food in the Last Year: Not on file   Transportation Needs:     Lack of Transportation (Medical): Not on file    Lack of Transportation (Non-Medical):  Not on file   Physical Activity:     Days of Exercise per Week: Not on file    Minutes of Exercise per Session: Not on file   Stress:     Feeling of Stress : Not on file   Social Connections:     Frequency of Communication with Friends and Family: Not on file    Frequency of Social Gatherings with Friends and Family: Not on file    Attends Jehovah's witness Services: Not on file    Active Member of [address] [place] [address] or Organizations: Not on file    Attends Club or Organization Meetings: Not on file    Marital Status: Not on file   Intimate Partner Violence:     Fear of Current or Ex-Partner: Not on file    Emotionally Abused: Not on file    Physically Abused: Not on file    Sexually Abused: Not on file   Housing Stability:     Unable to Pay for Housing in the Last Year: Not on file    Number of [place] in the Last Year: Not on file    Unstable Housing in the Last Year: Not on file         ALLERGIES: Contrast agent [iodine]    Review of Systems    Vitals:    12/06/21 1100   BP: 118/84   Pulse: 80   Resp: 18   Temp: 98.5 °F (36.9 °C)   SpO2: 98%   Height: 5' 4\" (1.626 m)            Physical Exam     MDM       Procedures

## 2022-03-19 PROBLEM — F41.9 ANXIETY: Status: ACTIVE | Noted: 2020-12-22

## 2022-03-19 PROBLEM — F32.9 MDD (MAJOR DEPRESSIVE DISORDER): Status: ACTIVE | Noted: 2020-12-22

## 2022-03-19 PROBLEM — N93.9 ABNORMAL UTERINE BLEEDING: Status: ACTIVE | Noted: 2020-12-22

## 2022-03-19 PROBLEM — Z72.0 TOBACCO USE: Status: ACTIVE | Noted: 2020-12-22

## 2022-03-20 PROBLEM — F43.10 PTSD (POST-TRAUMATIC STRESS DISORDER): Status: ACTIVE | Noted: 2020-12-22

## 2023-03-16 ENCOUNTER — HOSPITAL ENCOUNTER (INPATIENT)
Age: 43
LOS: 8 days | Discharge: HOME | End: 2023-03-24
Attending: STUDENT IN AN ORGANIZED HEALTH CARE EDUCATION/TRAINING PROGRAM | Admitting: PSYCHIATRY & NEUROLOGY
Payer: COMMERCIAL

## 2023-03-16 DIAGNOSIS — R45.851 SUICIDAL THOUGHTS: Primary | ICD-10-CM

## 2023-03-16 PROBLEM — F39 UNSPECIFIED MOOD (AFFECTIVE) DISORDER (HCC): Status: ACTIVE | Noted: 2023-03-16

## 2023-03-16 LAB
ALBUMIN SERPL-MCNC: 3.6 G/DL (ref 3.5–5)
ALBUMIN/GLOB SERPL: 1 (ref 1.1–2.2)
ALP SERPL-CCNC: 34 U/L (ref 45–117)
ALT SERPL-CCNC: 23 U/L (ref 12–78)
AMPHET UR QL SCN: POSITIVE
ANION GAP SERPL CALC-SCNC: 5 MMOL/L (ref 5–15)
APAP SERPL-MCNC: <2 UG/ML (ref 10–30)
APPEARANCE UR: CLEAR
AST SERPL-CCNC: 14 U/L (ref 15–37)
BACTERIA URNS QL MICRO: NEGATIVE /HPF
BARBITURATES UR QL SCN: NEGATIVE
BASOPHILS # BLD: 0.1 K/UL (ref 0–0.1)
BASOPHILS NFR BLD: 1 % (ref 0–1)
BENZODIAZ UR QL: NEGATIVE
BILIRUB SERPL-MCNC: 0.5 MG/DL (ref 0.2–1)
BILIRUB UR QL: NEGATIVE
BUN SERPL-MCNC: 15 MG/DL (ref 6–20)
BUN/CREAT SERPL: 21 (ref 12–20)
CALCIUM SERPL-MCNC: 9.2 MG/DL (ref 8.5–10.1)
CANNABINOIDS UR QL SCN: NEGATIVE
CHLORIDE SERPL-SCNC: 107 MMOL/L (ref 97–108)
CO2 SERPL-SCNC: 24 MMOL/L (ref 21–32)
COCAINE UR QL SCN: NEGATIVE
COLOR UR: ABNORMAL
CREAT SERPL-MCNC: 0.7 MG/DL (ref 0.55–1.02)
DIFFERENTIAL METHOD BLD: ABNORMAL
DRUG SCRN COMMENT,DRGCM: ABNORMAL
EOSINOPHIL # BLD: 0.3 K/UL (ref 0–0.4)
EOSINOPHIL NFR BLD: 3 % (ref 0–7)
EPITH CASTS URNS QL MICRO: ABNORMAL /LPF
ERYTHROCYTE [DISTWIDTH] IN BLOOD BY AUTOMATED COUNT: 13.2 % (ref 11.5–14.5)
ETHANOL SERPL-MCNC: <10 MG/DL
FLUAV RNA SPEC QL NAA+PROBE: NOT DETECTED
FLUBV RNA SPEC QL NAA+PROBE: NOT DETECTED
GLOBULIN SER CALC-MCNC: 3.5 G/DL (ref 2–4)
GLUCOSE SERPL-MCNC: 119 MG/DL (ref 65–100)
GLUCOSE UR STRIP.AUTO-MCNC: NEGATIVE MG/DL
HCG UR QL: NEGATIVE
HCT VFR BLD AUTO: 42 % (ref 35–47)
HGB BLD-MCNC: 13.6 G/DL (ref 11.5–16)
HGB UR QL STRIP: ABNORMAL
IMM GRANULOCYTES # BLD AUTO: 0 K/UL (ref 0–0.04)
IMM GRANULOCYTES NFR BLD AUTO: 0 % (ref 0–0.5)
KETONES UR QL STRIP.AUTO: 40 MG/DL
LEUKOCYTE ESTERASE UR QL STRIP.AUTO: NEGATIVE
LYMPHOCYTES # BLD: 4.5 K/UL (ref 0.8–3.5)
LYMPHOCYTES NFR BLD: 40 % (ref 12–49)
MCH RBC QN AUTO: 29.5 PG (ref 26–34)
MCHC RBC AUTO-ENTMCNC: 32.4 G/DL (ref 30–36.5)
MCV RBC AUTO: 91.1 FL (ref 80–99)
METHADONE UR QL: NEGATIVE
MONOCYTES # BLD: 0.8 K/UL (ref 0–1)
MONOCYTES NFR BLD: 7 % (ref 5–13)
MUCOUS THREADS URNS QL MICRO: ABNORMAL /LPF
NEUTS SEG # BLD: 5.6 K/UL (ref 1.8–8)
NEUTS SEG NFR BLD: 49 % (ref 32–75)
NITRITE UR QL STRIP.AUTO: NEGATIVE
NRBC # BLD: 0 K/UL (ref 0–0.01)
NRBC BLD-RTO: 0 PER 100 WBC
OPIATES UR QL: NEGATIVE
PCP UR QL: NEGATIVE
PH UR STRIP: 5.5 (ref 5–8)
PLATELET # BLD AUTO: 319 K/UL (ref 150–400)
PMV BLD AUTO: 9 FL (ref 8.9–12.9)
POTASSIUM SERPL-SCNC: 3.3 MMOL/L (ref 3.5–5.1)
PROT SERPL-MCNC: 7.1 G/DL (ref 6.4–8.2)
PROT UR STRIP-MCNC: NEGATIVE MG/DL
RBC # BLD AUTO: 4.61 M/UL (ref 3.8–5.2)
RBC #/AREA URNS HPF: ABNORMAL /HPF (ref 0–5)
SALICYLATES SERPL-MCNC: 2.8 MG/DL (ref 2.8–20)
SARS-COV-2 RNA RESP QL NAA+PROBE: NOT DETECTED
SODIUM SERPL-SCNC: 136 MMOL/L (ref 136–145)
SP GR UR REFRACTOMETRY: 1.03 (ref 1–1.03)
UA: UC IF INDICATED,UAUC: ABNORMAL
UROBILINOGEN UR QL STRIP.AUTO: 0.2 EU/DL (ref 0.2–1)
WBC # BLD AUTO: 11.2 K/UL (ref 3.6–11)
WBC URNS QL MICRO: ABNORMAL /HPF (ref 0–4)

## 2023-03-16 PROCEDURE — 99285 EMERGENCY DEPT VISIT HI MDM: CPT

## 2023-03-16 PROCEDURE — 80053 COMPREHEN METABOLIC PANEL: CPT

## 2023-03-16 PROCEDURE — 81025 URINE PREGNANCY TEST: CPT

## 2023-03-16 PROCEDURE — 82077 ASSAY SPEC XCP UR&BREATH IA: CPT

## 2023-03-16 PROCEDURE — 85025 COMPLETE CBC W/AUTO DIFF WBC: CPT

## 2023-03-16 PROCEDURE — 81001 URINALYSIS AUTO W/SCOPE: CPT

## 2023-03-16 PROCEDURE — 65270000029 HC RM PRIVATE

## 2023-03-16 PROCEDURE — 74011250637 HC RX REV CODE- 250/637: Performed by: STUDENT IN AN ORGANIZED HEALTH CARE EDUCATION/TRAINING PROGRAM

## 2023-03-16 PROCEDURE — 80179 DRUG ASSAY SALICYLATE: CPT

## 2023-03-16 PROCEDURE — 87636 SARSCOV2 & INF A&B AMP PRB: CPT

## 2023-03-16 PROCEDURE — 84443 ASSAY THYROID STIM HORMONE: CPT

## 2023-03-16 PROCEDURE — 80143 DRUG ASSAY ACETAMINOPHEN: CPT

## 2023-03-16 PROCEDURE — 36415 COLL VENOUS BLD VENIPUNCTURE: CPT

## 2023-03-16 PROCEDURE — 80307 DRUG TEST PRSMV CHEM ANLYZR: CPT

## 2023-03-16 PROCEDURE — 90791 PSYCH DIAGNOSTIC EVALUATION: CPT

## 2023-03-16 RX ORDER — BUSPIRONE HYDROCHLORIDE 5 MG/1
10 TABLET ORAL
Status: COMPLETED | OUTPATIENT
Start: 2023-03-16 | End: 2023-03-16

## 2023-03-16 RX ORDER — IBUPROFEN 600 MG/1
600 TABLET ORAL
Status: COMPLETED | OUTPATIENT
Start: 2023-03-16 | End: 2023-03-16

## 2023-03-16 RX ADMIN — IBUPROFEN 600 MG: 600 TABLET, FILM COATED ORAL at 18:39

## 2023-03-16 RX ADMIN — BUSPIRONE HYDROCHLORIDE 10 MG: 5 TABLET ORAL at 21:35

## 2023-03-16 NOTE — BSMART NOTE
BSMART assessment completed, and suicide risk level noted to be HIGH. Primary Nurse NA and Charge Nurse NA and Physician Dr. Cynthia Carl notified. Concerns not observed. Security/Off- has not been notified.

## 2023-03-16 NOTE — ED PROVIDER NOTES
Patient is a 45-year-old female presented ED with thoughts of suicide plan to hang self. Patient denies any medical concerns but states he generally has body aches and would like Tylenol. Based on patient's results Motrin ordered instead until patient's lab work returns. No signs of acute patient febrile. Patient says she feels dehydrated. Water given. The history is provided by the patient and medical records. Mental Health Problem   Functional status baseline:  [EPIC#1537^NOTE}   Suicidal  Pertinent negatives include no shortness of breath and no chest pain. No past medical history on file. No past surgical history on file. No family history on file.     Social History     Socioeconomic History    Marital status: SINGLE     Spouse name: Not on file    Number of children: Not on file    Years of education: Not on file    Highest education level: Not on file   Occupational History    Not on file   Tobacco Use    Smoking status: Former     Types: Cigarettes     Quit date: 2021     Years since quittin.6    Smokeless tobacco: Never   Substance and Sexual Activity    Alcohol use: No    Drug use: Not Currently     Types: Marijuana    Sexual activity: Not on file   Other Topics Concern     Service Not Asked    Blood Transfusions Not Asked    Caffeine Concern Not Asked    Occupational Exposure Not Asked    Hobby Hazards Not Asked    Sleep Concern Not Asked    Stress Concern Not Asked    Weight Concern Not Asked    Special Diet Not Asked    Back Care Not Asked    Exercise Not Asked    Bike Helmet Not Asked    Seat Belt Not Asked    Self-Exams Not Asked   Social History Narrative    Not on file     Social Determinants of Health     Financial Resource Strain: Not on file   Food Insecurity: Not on file   Transportation Needs: Not on file   Physical Activity: Not on file   Stress: Not on file   Social Connections: Not on file   Intimate Partner Violence: Not on file   Housing Stability: Not on file         ALLERGIES: Contrast agent [iodine]    Review of Systems   Constitutional:  Negative for activity change, appetite change and fever. Respiratory:  Negative for shortness of breath. Cardiovascular:  Negative for chest pain. Musculoskeletal:  Positive for myalgias. Psychiatric/Behavioral:  Positive for suicidal ideas. Vitals:    03/16/23 1708   BP: (!) 139/93   Pulse: (!) 104   Resp: 19   Temp: 98.7 °F (37.1 °C)   SpO2: 95%   Weight: 85 kg (187 lb 6.3 oz)   Height: 5' 4.5\" (1.638 m)            Physical Exam  Vitals and nursing note reviewed. Constitutional:       Appearance: Normal appearance. HENT:      Head: Normocephalic and atraumatic. Right Ear: External ear normal.      Left Ear: External ear normal.   Eyes:      Conjunctiva/sclera: Conjunctivae normal.   Cardiovascular:      Rate and Rhythm: Normal rate and regular rhythm. Pulses: Normal pulses. Heart sounds: Normal heart sounds. Pulmonary:      Effort: Pulmonary effort is normal.      Breath sounds: Normal breath sounds. Chest:      Chest wall: No deformity or tenderness. Abdominal:      Palpations: Abdomen is soft. Tenderness: There is no abdominal tenderness. Musculoskeletal:         General: No deformity or signs of injury. Normal range of motion. Skin:     General: Skin is warm and dry. Coloration: Skin is not jaundiced. Neurological:      General: No focal deficit present. Mental Status: She is alert and oriented to person, place, and time. Psychiatric:         Mood and Affect: Mood normal.         Behavior: Behavior normal.        Medical Decision Making  Amount and/or Complexity of Data Reviewed  Labs: ordered. Decision-making details documented in ED Course. Discussion of management or test interpretation with external provider(s): Discussed patient with BSMART    Risk  Prescription drug management.   Risk Details: Suicidal patient with plan    Critical Care  Total time providing critical care: 30-74 minutes    ED Course as of 03/16/23 2334   Thu Mar 16, 2023   2140 SARS-CoV-2 by PCR: Not detected [AL]   2140 Influenza A by PCR: Not detected [AL]   2140 Influenza B by PCR: Not detected [AL]   2140 Pregnancy test,urine (POC): Negative [AL]      ED Course User Index  [AL] West Hahn MD     LABORATORY RESULTS:  Labs Reviewed   CBC WITH AUTOMATED DIFF - Abnormal; Notable for the following components:       Result Value    WBC 11.2 (*)     ABS. LYMPHOCYTES 4.5 (*)     All other components within normal limits   DRUG SCREEN, URINE - Abnormal; Notable for the following components:    AMPHETAMINES Positive (*)     All other components within normal limits   URINALYSIS W/ REFLEX CULTURE - Abnormal; Notable for the following components:    Ketone 40 (*)     Blood MODERATE (*)     Epithelial cells MODERATE (*)     Mucus 3+ (*)     All other components within normal limits   ACETAMINOPHEN - Abnormal; Notable for the following components:    Acetaminophen level <2 (*)     All other components within normal limits   METABOLIC PANEL, COMPREHENSIVE - Abnormal; Notable for the following components:    Potassium 3.3 (*)     Glucose 119 (*)     BUN/Creatinine ratio 21 (*)     AST (SGOT) 14 (*)     Alk. phosphatase 34 (*)     A-G Ratio 1.0 (*)     All other components within normal limits   COVID-19 WITH INFLUENZA A/B   ETHYL ALCOHOL   SALICYLATE   HCG URINE, QL. - POC       IMAGING RESULTS:  No orders to display       MEDICATIONS GIVEN:  Medications   ibuprofen (MOTRIN) tablet 600 mg (600 mg Oral Given 3/16/23 1839)   busPIRone (BUSPAR) tablet 10 mg (10 mg Oral Given 3/16/23 2135)       Differential diagnosis: Suicidal thoughts, suicide plan, high risk suicide patient, homelessness, myalgias    ED physician interpretation of laboratory results: Lab work without critical values. Patient medically or for psych admission. MDM: Patient is a current female to the ED with thoughts of suicide. Patient evaluated BSMART and patient will require admission. Patient is voluntary. Motrin given for body aches, BuSpar as patient takes medication normally. BSMART cannot find bed placement. Patient otherwise stable. Total critical care time (not including time spent performing separately reportable procedures): 35, patient suicidal.    9:36 PM  Change of shift. Care of patient signed over to Dr. Calixto Zamarripa. Handoff complete. IMPRESSION:  1. Suicidal thoughts        DISPOSITION: [address] [place] [address]  Becky Oconnell MD      Procedures
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
## 2023-03-17 LAB
ATRIAL RATE: 76 BPM
CALCULATED R AXIS, ECG10: 35 DEGREES
CALCULATED T AXIS, ECG11: 16 DEGREES
DIAGNOSIS, 93000: NORMAL
P-R INTERVAL, ECG05: 104 MS
Q-T INTERVAL, ECG07: 378 MS
QRS DURATION, ECG06: 78 MS
QTC CALCULATION (BEZET), ECG08: 425 MS
TSH SERPL DL<=0.05 MIU/L-ACNC: 1.75 UIU/ML (ref 0.36–3.74)
VENTRICULAR RATE, ECG03: 76 BPM

## 2023-03-17 PROCEDURE — 93005 ELECTROCARDIOGRAM TRACING: CPT

## 2023-03-17 PROCEDURE — 65220000003 HC RM SEMIPRIVATE PSYCH

## 2023-03-17 PROCEDURE — 74011250637 HC RX REV CODE- 250/637: Performed by: NURSE PRACTITIONER

## 2023-03-17 RX ORDER — LORAZEPAM 2 MG/ML
1 INJECTION, SOLUTION INTRAMUSCULAR; INTRAVENOUS
Status: DISCONTINUED | OUTPATIENT
Start: 2023-03-17 | End: 2023-03-24 | Stop reason: HOSPADM

## 2023-03-17 RX ORDER — RISPERIDONE 1 MG/1
1 TABLET, FILM COATED ORAL
Status: DISCONTINUED | OUTPATIENT
Start: 2023-03-17 | End: 2023-03-17

## 2023-03-17 RX ORDER — OLANZAPINE 5 MG/1
5 TABLET ORAL
Status: DISCONTINUED | OUTPATIENT
Start: 2023-03-17 | End: 2023-03-24 | Stop reason: HOSPADM

## 2023-03-17 RX ORDER — ACETAMINOPHEN 325 MG/1
650 TABLET ORAL
Status: DISCONTINUED | OUTPATIENT
Start: 2023-03-17 | End: 2023-03-24 | Stop reason: HOSPADM

## 2023-03-17 RX ORDER — BUSPIRONE HYDROCHLORIDE 5 MG/1
7.5 TABLET ORAL 2 TIMES DAILY
Status: DISCONTINUED | OUTPATIENT
Start: 2023-03-17 | End: 2023-03-17

## 2023-03-17 RX ORDER — HALOPERIDOL 5 MG/ML
5 INJECTION INTRAMUSCULAR
Status: DISCONTINUED | OUTPATIENT
Start: 2023-03-17 | End: 2023-03-24 | Stop reason: HOSPADM

## 2023-03-17 RX ORDER — BENZTROPINE MESYLATE 1 MG/1
1 TABLET ORAL
Status: DISCONTINUED | OUTPATIENT
Start: 2023-03-17 | End: 2023-03-24 | Stop reason: HOSPADM

## 2023-03-17 RX ORDER — TRAZODONE HYDROCHLORIDE 50 MG/1
50 TABLET ORAL
Status: DISCONTINUED | OUTPATIENT
Start: 2023-03-17 | End: 2023-03-24 | Stop reason: HOSPADM

## 2023-03-17 RX ORDER — ADHESIVE BANDAGE
30 BANDAGE TOPICAL DAILY PRN
Status: DISCONTINUED | OUTPATIENT
Start: 2023-03-17 | End: 2023-03-24 | Stop reason: HOSPADM

## 2023-03-17 RX ORDER — DM/P-EPHED/ACETAMINOPH/DOXYLAM 30-7.5/3
2 LIQUID (ML) ORAL
Status: DISCONTINUED | OUTPATIENT
Start: 2023-03-17 | End: 2023-03-24 | Stop reason: HOSPADM

## 2023-03-17 RX ORDER — BUSPIRONE HYDROCHLORIDE 10 MG/1
10 TABLET ORAL 2 TIMES DAILY
Status: DISCONTINUED | OUTPATIENT
Start: 2023-03-17 | End: 2023-03-21

## 2023-03-17 RX ORDER — HYDROXYZINE 50 MG/1
50 TABLET, FILM COATED ORAL
Status: DISCONTINUED | OUTPATIENT
Start: 2023-03-17 | End: 2023-03-24 | Stop reason: HOSPADM

## 2023-03-17 RX ORDER — RISPERIDONE 1 MG/1
1 TABLET, FILM COATED ORAL
Status: DISCONTINUED | OUTPATIENT
Start: 2023-03-17 | End: 2023-03-21

## 2023-03-17 RX ORDER — DIPHENHYDRAMINE HYDROCHLORIDE 50 MG/ML
50 INJECTION, SOLUTION INTRAMUSCULAR; INTRAVENOUS
Status: DISCONTINUED | OUTPATIENT
Start: 2023-03-17 | End: 2023-03-24 | Stop reason: HOSPADM

## 2023-03-17 RX ADMIN — BUSPIRONE HYDROCHLORIDE 10 MG: 10 TABLET ORAL at 12:25

## 2023-03-17 RX ADMIN — NICOTINE POLACRILEX 2 MG: 2 LOZENGE ORAL at 20:31

## 2023-03-17 RX ADMIN — HYDROXYZINE HYDROCHLORIDE 50 MG: 50 TABLET, FILM COATED ORAL at 05:36

## 2023-03-17 RX ADMIN — RISPERIDONE 1 MG: 1 TABLET ORAL at 20:31

## 2023-03-17 RX ADMIN — BUSPIRONE HYDROCHLORIDE 10 MG: 10 TABLET ORAL at 17:15

## 2023-03-17 RX ADMIN — HYDROXYZINE HYDROCHLORIDE 50 MG: 50 TABLET, FILM COATED ORAL at 20:33

## 2023-03-17 NOTE — PROGRESS NOTES
Spiritual Care Assessment/Progress Note  [facility]      NAME: Momo Cronin      MRN: [id number]  AGE: 40 y.o. SEX: female  Rastafari Affiliation: No Muslim   Language: English     3/17/2023     Total Time (in minutes): 25     Spiritual Assessment begun in [facility] through conversation with:         [x]Patient        [] Family    [] Friend(s)        Reason for Consult: Spiritual care volunteer (3.17.23)     Spiritual beliefs: (Please include comment if needed)     [] Identifies with a farheen tradition:         [] Supported by a farheen community:            [] Claims no spiritual orientation:           [] Seeking spiritual identity:                [] Adheres to an individual form of spirituality:           [] Not able to assess:                           Identified resources for coping:      [x] Prayer                               [] Music                  [] Guided Imagery     [x] Family/friends                 [] Pet visits     [x] Devotional reading                         [] Unknown     [] Other:                                               Interventions offered during this visit: (See comments for more details)       Plan of Care:     [] Support spiritual and/or cultural needs    [] Support AMD and/or advance care planning process      [] Support grieving process   [] Coordinate Rites and/or Rituals    [] Coordination with community clergy   [] No spiritual needs identified at this time   [] Detailed Plan of Care below (See Comments)  [] Make referral to Music Therapy  [] Make referral to Pet Therapy     [] Make referral to Addiction services  [] Make referral to [facility]  [] Make referral to Spiritual Care Partner  [] No future visits requested        [] Contact Spiritual Care for further referrals     Comments: Due to language in the consult. We had our spiritual care partner/ Eucharistic  visit with patient.

## 2023-03-17 NOTE — BH NOTES
GROUP THERAPY PROGRESS NOTE    Patient did not participate in recreational therapy group.     ELMA Aguilar, HP-A

## 2023-03-17 NOTE — BSMART NOTE
Patient accepted to [address] by KARL Albright on the behalf of Dr. Capone April to acute  Bed 1.

## 2023-03-17 NOTE — BH NOTES
TRANSFER - IN REPORT:    Verbal report received from CAROL Syed(name) on [employer]  being received from [facility], ED(unit) for routine progression of care      Report consisted of patients Situation, Background, Assessment and   Recommendations(SBAR). Information from the following report(s) SBAR, Kardex, ED Summary, and Med Rec Status was reviewed with the receiving nurse. Opportunity for questions and clarification was provided. Assessment completed upon patients arrival to unit and care assumed.
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
## 2023-03-17 NOTE — PROGRESS NOTES
Problem: Falls - Risk of  Goal: *Absence of Falls  Description: Document Doc Seals Fall Risk and appropriate interventions in the flowsheet.   Outcome: Progressing Towards Goal  Note: Fall Risk Interventions:            Medication Interventions: Teach patient to arise slowly          Will continue q 15 minute safety checks

## 2023-03-17 NOTE — BH NOTES
Joi Gray RN and  Madalyn (CAROL) performed a dual skin assessment on this patient. No impairment noted. Patient has old scars on her upper back. Pressure Ulcer Documentation   (COMPLETE ONE LABEL PER PRESSURE ULCER)   For further information, please review corresponding Wound Care flowsheet. No pressure ulcer noted and pressure ulcer prevention initiated.    Vick Rivera RN

## 2023-03-17 NOTE — ED NOTES
Patient changed into green gown. Belongings placed at nurses station. Security called for wanding. Patient calm and cooperative.

## 2023-03-17 NOTE — PROGRESS NOTES
Problem: Depressed Mood (Adult/Pediatric)  Goal: *STG: Participates in treatment plan  Outcome: Progressing Towards Goal  Goal: *STG: Demonstrates reduction in symptoms and increase in insight into coping skills/future focused  Outcome: Not Progressing Towards Goal  Goal: *STG: Remains safe in hospital  Outcome: Progressing Towards Goal

## 2023-03-17 NOTE — INTERDISCIPLINARY ROUNDS
Behavioral Health Interdisciplinary Rounds     Patient Name: Lauryn Arellano  Age: 40 y.o. Room/Bed:  728/  Primary Diagnosis: <principal problem not specified>   Admission Status: Voluntary     Readmission within 30 days: no  Power of  in place: no  Patient requires a blocked bed: no          Reason for blocked bed:       Flu Vaccine :    Consults:          Labs/Testing due today? Have they refused labs?:     Sleep hours:  5+      Participation in Care/Groups:    Medication Compliant?:   PRNS (last 24 hours): None    Restraints (last 24 hours):  no     CIWA (range last 24 hours):     COWS (range last 24 hours):      ________________________________________________________________  OQ Admission Analysis Survey completed:  OQ Admission Analysis Survey score:  OQ Discharge Analysis Survey completed:  OQ Discharge Analysis Survey score:     _____________________________________________________________________    Alcohol screening (AUDIT) completed -         If applicable, date SBIRT discussed in treatment team AND documented:   AUDIT Screen Score:        Document Brief Intervention (corresponds directly with the 5 A's, Ask, Advise, Assess, Assist, and Arrange): At- Risk Patients (Score 7-15 for women; 8-15 for men)  Discuss concern patient is drinking at unhealthy levels known to increase risk of alcohol-related health problems. Is Patient ready to commit to change? If No:  Encourage reflection  Discuss short term and long term health risks of consuming alcohol  Barriers to change  Reaffirm willingness to help / Educational materials provided  If Yes:  Set goal  Plan  Educational materials provided    Harmful use or Dependence (Score 16 or greater)  Discuss short term and long term health risks of consuming alcohol  Recommendations  Negotiate drinking goal  Recommend addiction specialist/center  Arrange follow-up appointments.     Tobacco - patient is a smoker:    Illegal Drugs use:      24 hour chart check complete: yes   ____________________________________________________________________________________________________________    Patient goal(s) for today:   Treatment team focus/goals:   Progress note: Pt met with treatment team for the first time since admission and appears with a drowsy and sad mood and affect. Pt denies SI/HI, denies active SI but feels passive SI from being tired from stressors and feelings of isolation. Patient endorses AH and VH of seeing and hearing spirits relating to her \"spiritual acuteness\", denies wanting to share with treatment team but only feels comfortable sharing with pastoral care. 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 Patient has had several previous hospitalizations. Patient feels isolated since 2021, pt is tearful stating that she hasn't been hugged in a long time and that her old friends don't keep in touch, feeling isolated from her mother stating Bernie Rocha are you an only child and still not your parents favorite child\". Plan to start medications, SW plan to refer to [facility].     LOS:  1  Expected LOS: 5-7    Financial concerns/prescription coverage:    Family contact:       Family requesting physician contact today:    Discharge plan:   Access to weapons :         Outpatient provider(s):   Patient's preferred phone number for follow up call :   Patient's preferred e-mail address :  Participating treatment team members: Carmina Michelle, Finesse Vera, MSW, Zia Sumner, CAROL, Teresa Palacios, KARL

## 2023-03-17 NOTE — ED NOTES
TRANSFER - OUT REPORT:    Verbal report given to Manolo Hager RN (name) on [employer]  being transferred to [address] [place] [address] (unit) for routine progression of care       Report consisted of patients Situation, Background, Assessment and   Recommendations(SBAR). Information from the following report(s) SBAR, ED Summary, [facility] and Recent Results was reviewed with the receiving nurse. Lines:       Opportunity for questions and clarification was provided. Patient transported with:   [employer]. Patient remains calm and cooperative upon transfer.

## 2023-03-17 NOTE — BH NOTES
[facility] [place] [facility]  Cooperative, anxious, tearful, with periods of loud outbursts.   Limits set and educated on unit and routine  Denies ETOH and drugs   Smoker-cessation education given  UDS + Amphetamines on Adderall  No complaints  Started on [facility] admit protocol  Orientated to unit and call bell  Recently homeless  OQ completed

## 2023-03-17 NOTE — H&P
[facility]  INITIAL PSYCHIATRIC INTERVIEW:    Name: Arabella Aguilar  MR#: [id number]  ACCOUNT#: [de-identified]  : 1980  ADMIT DATE: 3/16/2023    CHIEF COMPLAINT: \"I don't want to die, but I don't want to go on living this way. \"    HISTORY OF PRESENTING COMPLAINT:  This is a 40 y.o. female who is currently admitted to the psychiatric floor at [facility] on a voluntary basis for suicidal ideation with plan to hang herself. Psychosocial stressors contributing to this current mental health crisis include recent homelessness, feeling alone and isolated, and having a \"spiritual crisis. \" She states she was looking for places to hang herself. She states she doesn't want to die, but she doesn't want to continue to live like this. She reports she lost \"everything\" in , and the pain has been persistent since then. Pt feels she had a spiritual gift, but she states that after she watched an exorcism with her  in , she was \"forever changed. \" The exorcism was at her kitchen table. Pt appears to be religiously preoccupied; she reports she was arrested outside of [employer] in . She exhibited disorganized thought process and endorses perceptual disturbances to include visions and voices that she perceives as spirits. She does not wish to disclose the content of the perceptual disturbances to treatment team, but rather states she would prefer to disclose that to pastoral care. She denies current SI/plan/intent, only stating she does not wish to continue living like this. No HI/plan/intent. PAST PSYCHIATRIC HISTORY: Pt has a hx of depression and PTSD. She has a hx of multiple past psychiatric admissions, most recent psychiatric admission was at [facility] in May 2022. Pt sees Paty Bauer NP at [facility]. She has been taking 30mg Adderall daily and Buspar 7.5mg BID.  She does not have a therapist. She reports one serious suicide attempt in  after being told she had early onset Parkinson's. She believes she has ADHD and states \"ADHD testing at 46 revolutionized my whole life. \" She has been on stimulants for several years, and prior to that she has been on several antidepressants. SUBSTANCE ABUSE HISTORY: Pt uses THC. Denies etoh or other substances. Denies THC use. PSYCHOSOCIAL HISTORY: Pt is currently homeless. She is . She does not have children. Legal hx includes being arrested for trespassing on [employer] property in 2021 and \"covering myself in the blood of [place].\" She does not currently work. PAST MEDICAL HISTORY: Reviewed per H&P. ALLERGIES: contrast agent (iodine)    MENTAL STATUS EXAM: The patient is a 40 y.o.  female who is in moderate grooming, dressed in a hospital gown. The patient's affect was constricted and guarded and incongruent. Eye contact was poor. Mood is dysphoric. Thought process is disorganized and perseverative. Thought blocking was observed. Thought content is significant for Spiritism preoccupation. Pt endorsed passive thoughts of suicide. Pt denied thoughts of harming others. There was no agitation or lability noted. Speech was spontaneous but abnormal in rhythm and tone was high pitched and quiet, and sobbing at times. Attention and concentration are poor. Judgment and insight are noted to be poor. DIAGNOSTIC IMPRESSION: Unspecified psychosis vs MDD with psychotic features; r/o stimulant use induced psychosis; PTSD. PLAN:   Continue inpatient stay for the safety and optimum care of the patient. Routine labs to be ordered as needed. Psychotropic medications will be ordered and adjusted as needed. Adderall will not be continued, and advised pt that it is not recommended that she continue taking Adderall or any other stimulant on the outpatient level. Pt wishes to restart Buspar.  Pt agreeable to consider Risperdal for psychosis/mood; she may decline, but we reviewed risks/benefits/alternatives and she is agreeable for it to be ordered at least. Pt may benefit from [facility] if tolerated. Reviewed risks/benefits/alternatives, including the risks of not treating including but not limited to tardive dyskinesia, which pt understands can potentially be permanent; EPS, metabolic changes including increased risk of diabetes, weight gain, increases in cholesterol, triglycerides, fasting blood sugar, and neuroleptic malignant syndrome, as well as cardiac arrhythmias including QT prolongation and torsades de pointes. Pt verbalized understanding of these points and was given the opportunity to ask questions. Pt provided informed consent to try this medication. Supportive, milieu and group therapy. Estimated length of stay is 5-7 days, dependent upon pt's participation in treatment, response to pharmacological and non-pharmacological interventions, and follow-up plan including outpatient providers and safe environment for discharge. I certify that this patient's inpatient psychiatric hospital admission is medically necessary for treatment which could reasonably be expected to improve this patient's condition. The inpatient psychiatric services are provided while the individual is under the care of a psychiatric provider and are included in the individualized plan of care.

## 2023-03-18 PROCEDURE — 74011250637 HC RX REV CODE- 250/637: Performed by: NURSE PRACTITIONER

## 2023-03-18 PROCEDURE — 65220000003 HC RM SEMIPRIVATE PSYCH

## 2023-03-18 RX ADMIN — HYDROXYZINE HYDROCHLORIDE 50 MG: 50 TABLET, FILM COATED ORAL at 20:03

## 2023-03-18 RX ADMIN — NICOTINE POLACRILEX 2 MG: 2 LOZENGE ORAL at 16:10

## 2023-03-18 RX ADMIN — BUSPIRONE HYDROCHLORIDE 10 MG: 10 TABLET ORAL at 08:27

## 2023-03-18 RX ADMIN — NICOTINE POLACRILEX 2 MG: 2 LOZENGE ORAL at 19:50

## 2023-03-18 RX ADMIN — NICOTINE POLACRILEX 2 MG: 2 LOZENGE ORAL at 08:27

## 2023-03-18 RX ADMIN — BUSPIRONE HYDROCHLORIDE 10 MG: 10 TABLET ORAL at 16:10

## 2023-03-18 RX ADMIN — ACETAMINOPHEN 650 MG: 325 TABLET ORAL at 07:29

## 2023-03-18 RX ADMIN — RISPERIDONE 1 MG: 1 TABLET ORAL at 20:03

## 2023-03-18 NOTE — PROGRESS NOTES
Problem: Depressed Mood (Adult/Pediatric)  Goal: *STG: Participates in treatment plan  Outcome: Progressing Towards Goal  Goal: *STG: Remains safe in hospital  Outcome: Progressing Towards Goal  Goal: *STG: Complies with medication therapy  Outcome: Progressing Towards Goal  Note: per RN report pt displays periodic outburst; screaming. Received pt alert and oriented visible on the unit. Depressed and anxious; mood and affect congruent. Pt verbalizes needs and concerns to staff. Pt is isolative to her room during the afternoon. Pt denies SI, AVH. Pt requests pastoral care consult. Pt declines to meet with pastoral care. 1527- pt is resting in her room. 1629- increased time spent visible on the unit. Medication and meal compliant.

## 2023-03-18 NOTE — PROGRESS NOTES
Problem: Falls - Risk of  Goal: *Absence of Falls  Description: Document Delvin Jack Fall Risk and appropriate interventions in the flowsheet. Outcome: Progressing Towards Goal  Note: Fall Risk Interventions:            Medication Interventions: Teach patient to arise slowly    Resting in bed with eyes closed, no complaints, no distress noted. Safety measures in place, will continue to monitor.

## 2023-03-18 NOTE — PROGRESS NOTES
Referral source:   Franklin Mitchell at [facility] in [facility]. I reviewed the medical record prior to this encounter. Spiritual Assessment:     MS. Shanna Luo shared that it was not the best time because she is a little trigger from  meeting earlier.  was able to hold space as she tried forming her words to express this. Once it was named  offered to come back latter after MS. Debby Wilburn had time to rest which was named as a away that would help her feel better. Outcome: Debbie MORALES Elieserilda expressed appreciation for pastoral support and the opportunity to share their thoughts and feelings. But need time to rest.      Plan of Care: Patient is aware of  availability and was encouraged to request   support as needed. Advised nurse to contact [facility] for any further referrals. The  on-call can be reached at ([phone]). Rev.  Miki Ellis MDiv, MAPT  Staff

## 2023-03-18 NOTE — BH NOTES
Chief Complaint:  \"I'm sad\"    Length of Stay: 2 Days    Interval History:  Violet Wilder is tearful this morning, however no actual tears are produced. She reports that she just has no one and does not know if she is able to make it anymore. She continues to reports AH/VH of spirits. She has been compliant with her medications and denies any side effects to them. She slept fairly last night. Past Medical History:  No past medical history on file. Labs:  Lab Results   Component Value Date/Time    WBC 11.2 (H) 03/16/2023 08:26 PM    HGB 13.6 03/16/2023 08:26 PM    HCT 42.0 03/16/2023 08:26 PM    PLATELET 527 12/81/8257 08:26 PM    MCV 91.1 03/16/2023 08:26 PM      Lab Results   Component Value Date/Time    Sodium 136 03/16/2023 09:53 PM    Potassium 3.3 (L) 03/16/2023 09:53 PM    Chloride 107 03/16/2023 09:53 PM    CO2 24 03/16/2023 09:53 PM    Anion gap 5 03/16/2023 09:53 PM    Glucose 119 (H) 03/16/2023 09:53 PM    BUN 15 03/16/2023 09:53 PM    Creatinine 0.70 03/16/2023 09:53 PM    BUN/Creatinine ratio 21 (H) 03/16/2023 09:53 PM    GFR est AA >60 12/21/2020 11:26 PM    GFR est non-AA >60 12/21/2020 11:26 PM    Calcium 9.2 03/16/2023 09:53 PM    Bilirubin, total 0.5 03/16/2023 09:53 PM    Alk.  phosphatase 34 (L) 03/16/2023 09:53 PM    Protein, total 7.1 03/16/2023 09:53 PM    Albumin 3.6 03/16/2023 09:53 PM    Globulin 3.5 03/16/2023 09:53 PM    A-G Ratio 1.0 (L) 03/16/2023 09:53 PM    ALT (SGPT) 23 03/16/2023 09:53 PM      Vitals:    03/17/23 0751 03/17/23 1549 03/17/23 1937 03/18/23 0744   BP: 113/75 106/74 116/76 102/65   Pulse: 86 90 84 88   Resp: 16 16 16 16   Temp: 98.7 °F (37.1 °C) 99.6 °F (37.6 °C) 98.9 °F (37.2 °C) 98.9 °F (37.2 °C)   SpO2: 94% 95% 97% 96%   Weight:       Height:             Current Facility-Administered Medications   Medication Dose Route Frequency Provider Last Rate Last Admin    OLANZapine (ZyPREXA) tablet 5 mg  5 mg Oral Q6H PRN Kortney No NP        haloperidol lactate (HALDOL) injection 5 mg  5 mg IntraMUSCular Q6H PRN Kortney No A, NP        benztropine (COGENTIN) tablet 1 mg  1 mg Oral BID PRN Kortney No, NP        diphenhydrAMINE (BENADRYL) injection 50 mg  50 mg IntraMUSCular BID PRN Nory Noanie A, NP        hydrOXYzine HCL (ATARAX) tablet 50 mg  50 mg Oral TID PRN Garth Salas A, NP   50 mg at 03/17/23 2033    LORazepam (ATIVAN) 2 mg/mL injection 1 mg  1 mg IntraMUSCular Q4H PRN Kortney No A, NP        traZODone (DESYREL) tablet 50 mg  50 mg Oral QHS PRN Kortney No, NP        acetaminophen (TYLENOL) tablet 650 mg  650 mg Oral Q4H PRN Pamella Noe A, NP   650 mg at 03/18/23 0729    magnesium hydroxide (MILK OF MAGNESIA) 400 mg/5 mL oral suspension 30 mL  30 mL Oral DAILY PRN Kortney No, NP        busPIRone (BUSPAR) tablet 10 mg  10 mg Oral BID Tarry Muhammad, NP   10 mg at 03/18/23 0827    risperiDONE (RisperDAL) tablet 1 mg  1 mg Oral QHS Tarry Muhammad, NP   1 mg at 03/17/23 2031    nicotine buccal (POLACRILEX) lozenge 2 mg  2 mg Oral Q2H PRN Tarhannah Muhammad, NP   2 mg at 03/18/23 0827         Mental Status Exam:  Eye contact: fair  Grooming: fair  Psychomotor activity: wnl  Speech is spontaneous  Mood is \"terrible \"  Affect: labile  Perception: denies avh  Suicidal ideation: +SI  Cognition is grossly intact         Physical Exam:  Body habitus: Body mass index is 31.67 kg/m². Musculoskeletal system: normal gait  Tremor - neg  Cog wheeling - neg      Assessment and Plan:  Farhat Barrera meets criteria for a diagnosis of Unspecified psychosis vs MDD with psychotic features; r/o stimulant use induced psychosis; PTSD. Continue the medication regimen as prescribed  Disposition planning to continue. A coordinated, multidisplinary treatment team round was conducted with the patient, nurses, pharmcist,  and writer present.  Discussions held with , and/or with family members; Complete current electronic health record for patient was reviewed in full including consultant notes, ancillary staff notes, nurses and tech notes, labs and vitals. I certify that this patients inpatient psychiatric hospital services furnished since the previous certification were, and continue to be, required for treatment that could reasonably be expected to improve the patient's condition, or for diagnostic study, and that the patient continues to need, on a daily basis, active treatment furnished directly by or requiring the supervision of inpatient psychiatric facility personnel. In addition, the hospital records show that services furnished were intensive treatment services, admission or related services, or equivalent services.

## 2023-03-18 NOTE — BH NOTES
PRN Medication Documentation    Specific patient behavior that led to need for PRN medication: c/o anxiety  Staff interventions aempted prior to PRN being given: coping  skill  PRN medication given: atarax  Patient response/effectiveness of PRN medication: tl aware

## 2023-03-19 LAB — SARS-COV-2, COV2: NORMAL

## 2023-03-19 PROCEDURE — 65220000003 HC RM SEMIPRIVATE PSYCH

## 2023-03-19 PROCEDURE — U0005 INFEC AGEN DETEC AMPLI PROBE: HCPCS

## 2023-03-19 PROCEDURE — 74011250637 HC RX REV CODE- 250/637: Performed by: NURSE PRACTITIONER

## 2023-03-19 RX ORDER — MEDROXYPROGESTERONE ACETATE 150 MG/ML
150 INJECTION, SUSPENSION INTRAMUSCULAR ONCE
COMMUNITY

## 2023-03-19 RX ORDER — BUSPIRONE HYDROCHLORIDE 7.5 MG/1
7.5 TABLET ORAL 2 TIMES DAILY
COMMUNITY
End: 2023-03-24

## 2023-03-19 RX ORDER — DEXTROAMPHETAMINE SACCHARATE, AMPHETAMINE ASPARTATE, DEXTROAMPHETAMINE SULFATE AND AMPHETAMINE SULFATE 7.5; 7.5; 7.5; 7.5 MG/1; MG/1; MG/1; MG/1
15 TABLET ORAL 2 TIMES DAILY
COMMUNITY
End: 2023-03-24

## 2023-03-19 RX ADMIN — RISPERIDONE 1 MG: 1 TABLET ORAL at 21:59

## 2023-03-19 RX ADMIN — BUSPIRONE HYDROCHLORIDE 10 MG: 10 TABLET ORAL at 08:34

## 2023-03-19 RX ADMIN — NICOTINE POLACRILEX 2 MG: 2 LOZENGE ORAL at 08:34

## 2023-03-19 RX ADMIN — NICOTINE POLACRILEX 2 MG: 2 LOZENGE ORAL at 17:36

## 2023-03-19 RX ADMIN — BUSPIRONE HYDROCHLORIDE 10 MG: 10 TABLET ORAL at 17:35

## 2023-03-19 RX ADMIN — NICOTINE POLACRILEX 2 MG: 2 LOZENGE ORAL at 13:25

## 2023-03-19 NOTE — PROGRESS NOTES
Problem: Depressed Mood (Adult/Pediatric)  Goal: *STG: Participates in treatment plan  Outcome: Progressing Towards Goal  Goal: *STG: Remains safe in hospital  Outcome: Progressing Towards Goal  Goal: *STG: Complies with medication therapy  Outcome: Progressing Towards Goal  Note: Pt is alert and oriented visible on the unit. Sad with flat affect. Denies SI. Pt is medication and meal compliant. Guarded, sad, and depressed. Mood and affect congruent. 1548- pt is resting in her room.

## 2023-03-19 NOTE — BH NOTES
Chief Complaint:  \"I'm sad\"    Length of Stay: 3 Days    Interval History:  Tomas Daniels reports that she has been sleeping a lot today. Denies medication side effects. +AH/VH - \"I don't want to get into it. \" She has been isolative to her room. No acute overnight events are reported. She has been compliant with medications. Past Medical History:  No past medical history on file. Labs:  Lab Results   Component Value Date/Time    WBC 11.2 (H) 03/16/2023 08:26 PM    HGB 13.6 03/16/2023 08:26 PM    HCT 42.0 03/16/2023 08:26 PM    PLATELET 982 57/70/7514 08:26 PM    MCV 91.1 03/16/2023 08:26 PM      Lab Results   Component Value Date/Time    Sodium 136 03/16/2023 09:53 PM    Potassium 3.3 (L) 03/16/2023 09:53 PM    Chloride 107 03/16/2023 09:53 PM    CO2 24 03/16/2023 09:53 PM    Anion gap 5 03/16/2023 09:53 PM    Glucose 119 (H) 03/16/2023 09:53 PM    BUN 15 03/16/2023 09:53 PM    Creatinine 0.70 03/16/2023 09:53 PM    BUN/Creatinine ratio 21 (H) 03/16/2023 09:53 PM    GFR est AA >60 12/21/2020 11:26 PM    GFR est non-AA >60 12/21/2020 11:26 PM    Calcium 9.2 03/16/2023 09:53 PM    Bilirubin, total 0.5 03/16/2023 09:53 PM    Alk.  phosphatase 34 (L) 03/16/2023 09:53 PM    Protein, total 7.1 03/16/2023 09:53 PM    Albumin 3.6 03/16/2023 09:53 PM    Globulin 3.5 03/16/2023 09:53 PM    A-G Ratio 1.0 (L) 03/16/2023 09:53 PM    ALT (SGPT) 23 03/16/2023 09:53 PM      Vitals:    03/18/23 1544 03/18/23 1940 03/19/23 0716 03/19/23 1117   BP: 99/65 103/69 107/72 110/75   Pulse: 88 87 82 98   Resp: 16 14 16    Temp: 98.8 °F (37.1 °C) 98.9 °F (37.2 °C) 98.4 °F (36.9 °C) 98.9 °F (37.2 °C)   SpO2: 100%   99%   Weight:    85.5 kg (188 lb 6.4 oz)   Height:             Current Facility-Administered Medications   Medication Dose Route Frequency Provider Last Rate Last Admin    OLANZapine (ZyPREXA) tablet 5 mg  5 mg Oral Q6H PRN Kortney No NP        haloperidol lactate (HALDOL) injection 5 mg  5 mg IntraMUSCular Q6H PRN [place] Shari A, NP        benztropine (COGENTIN) tablet 1 mg  1 mg Oral BID PRN Kortney No NP        diphenhydrAMINE (BENADRYL) injection 50 mg  50 mg IntraMUSCular BID PRN Kortney No NP        hydrOXYzine HCL (ATARAX) tablet 50 mg  50 mg Oral TID PRN [place] Shari ROSALBA, NP   50 mg at 03/18/23 2003    LORazepam (ATIVAN) 2 mg/mL injection 1 mg  1 mg IntraMUSCular Q4H PRN Kortney No NP        traZODone (DESYREL) tablet 50 mg  50 mg Oral QHS PRN Kortney No NP        acetaminophen (TYLENOL) tablet 650 mg  650 mg Oral Q4H PRN Kortney No NP   650 mg at 03/18/23 0729    magnesium hydroxide (MILK OF MAGNESIA) 400 mg/5 mL oral suspension 30 mL  30 mL Oral DAILY PRN Kortney No NP        busPIRone (BUSPAR) tablet 10 mg  10 mg Oral BID Nicholos Buerger, NP   10 mg at 03/19/23 0834    risperiDONE (RisperDAL) tablet 1 mg  1 mg Oral QHS Nicholos Buerger, NP   1 mg at 03/18/23 2003    nicotine buccal (POLACRILEX) lozenge 2 mg  2 mg Oral Q2H PRN Nicholos Buerger, NP   2 mg at 03/19/23 9062         Mental Status Exam:  Eye contact: fair  Grooming: fair  Psychomotor activity: wnl  Speech is spontaneous  Mood is \"terrible \"  Affect: labile  Perception: denies avh  Suicidal ideation: +SI  Cognition is grossly intact         Physical Exam:  Body habitus: Body mass index is 31.84 kg/m². Musculoskeletal system: normal gait  Tremor - neg  Cog wheeling - neg      Assessment and Plan:  Licha Barrera meets criteria for a diagnosis of Unspecified psychosis vs MDD with psychotic features; r/o stimulant use induced psychosis; PTSD. Continue the medication regimen as prescribed  Disposition planning to continue. A coordinated, multidisplinary treatment team round was conducted with the patient, nurses, pharmcist,  and writer present. Discussions held with , and/or with family members;  Complete current electronic health record for patient was reviewed in full including consultant notes, ancillary staff notes, nurses and tech notes, labs and vitals. I certify that this patients inpatient psychiatric hospital services furnished since the previous certification were, and continue to be, required for treatment that could reasonably be expected to improve the patient's condition, or for diagnostic study, and that the patient continues to need, on a daily basis, active treatment furnished directly by or requiring the supervision of inpatient psychiatric facility personnel. In addition, the hospital records show that services furnished were intensive treatment services, admission or related services, or equivalent services.

## 2023-03-19 NOTE — PROGRESS NOTES
Patient received resting in bed with eyes closed. NAD and no complaints noted. Safety measures in place. Will continue to monitor with q15min rounds. Problem: Falls - Risk of  Goal: *Absence of Falls  Description: Document Yogesh Gonzales Fall Risk and appropriate interventions in the flowsheet.   Outcome: Progressing Towards Goal  Note: Fall Risk Interventions:            Medication Interventions: Teach patient to arise slowly

## 2023-03-20 LAB
SARS-COV-2 RNA RESP QL NAA+PROBE: NOT DETECTED
SOURCE, COVRS: NORMAL

## 2023-03-20 PROCEDURE — 65220000003 HC RM SEMIPRIVATE PSYCH

## 2023-03-20 PROCEDURE — 74011250637 HC RX REV CODE- 250/637: Performed by: NURSE PRACTITIONER

## 2023-03-20 RX ADMIN — NICOTINE POLACRILEX 2 MG: 2 LOZENGE ORAL at 19:44

## 2023-03-20 RX ADMIN — BUSPIRONE HYDROCHLORIDE 10 MG: 10 TABLET ORAL at 08:51

## 2023-03-20 RX ADMIN — NICOTINE POLACRILEX 2 MG: 2 LOZENGE ORAL at 16:37

## 2023-03-20 RX ADMIN — BUSPIRONE HYDROCHLORIDE 10 MG: 10 TABLET ORAL at 16:37

## 2023-03-20 RX ADMIN — ACETAMINOPHEN 650 MG: 325 TABLET ORAL at 08:52

## 2023-03-20 RX ADMIN — NICOTINE POLACRILEX 2 MG: 2 LOZENGE ORAL at 11:00

## 2023-03-20 RX ADMIN — NICOTINE POLACRILEX 2 MG: 2 LOZENGE ORAL at 07:15

## 2023-03-20 NOTE — PROGRESS NOTES
Problem: Falls - Risk of  Goal: *Absence of Falls  Description: Document Franklin Qureshi Fall Risk and appropriate interventions in the flowsheet. Outcome: Progressing Towards Goal  Note: Fall Risk Interventions:     Medication Interventions: Teach patient to arise slowly      Patient received resting quietly in bed. No signs of distress. Even and unlabored breathing. Staff will continue to monitor safety q15 and provide support.

## 2023-03-20 NOTE — INTERDISCIPLINARY ROUNDS
Behavioral Health Interdisciplinary Rounds     Patient Name: Jesus Tom  Age: 40 y.o. Room/Bed:  728/  Primary Diagnosis: <principal problem not specified>   Admission Status: Voluntary     Readmission within 30 days: no  Power of  in place: no  Patient requires a blocked bed: no          Reason for blocked bed:   Sleep hours: 7+       Participation in Care/Groups:  no  Medication Compliant?: Yes  PRNS (last 24 hours):  Antianxiety    Restraints (last 24 hours):  no  __________________________________________________  OQ Admission Analysis Survey completed:  OQ Admission Analysis Survey score:  OQ Discharge Analysis Survey completed:  OQ Discharge Analysis Survey score:   __________________________________________________     Alcohol screening (AUDIT) completed -  AUDIT Score: 0  If applicable, date SBIRT discussed in treatment team AND documented:    Tobacco - patient is a smoker: Have You Used Tobacco in the Past 30 Days: Yes  Illegal Drugs use: Have You Used Any Illegal Substances Over the Past 12 Months: No    24 hour chart check complete: yes     _______________________________________________    Patient goal(s) for today:   Treatment team focus/goals:   Progress note:      Spiritual Care Consult:   Financial concerns/prescription coverage:    Family contact:                        Family requesting physician contact today:    Discharge plan:   Access to weapons :                                                              Outpatient provider(s):   Patient's preferred phone number for follow up call :   Patient's preferred e-mail address :    LOS:  4  Expected LOS:     Participating treatment team members: Jesus Tom, * (assigned SW),

## 2023-03-20 NOTE — INTERDISCIPLINARY ROUNDS
Behavioral Health Interdisciplinary Rounds     Patient Name: Benjamin Sethi  Age: 40 y.o. Room/Bed:  728/  Primary Diagnosis: <principal problem not specified>   Admission Status: Voluntary     Readmission within 30 days: no  Power of  in place: no  Patient requires a blocked bed: no          Reason for blocked bed:   Sleep hours: 7+       Participation in Care/Groups:  no  Medication Compliant?: Yes  PRNS (last 24 hours): Antianxiety    Restraints (last 24 hours):  no  __________________________________________________  OQ Admission Analysis Survey completed:  OQ Admission Analysis Survey score:  OQ Discharge Analysis Survey completed:  OQ Discharge Analysis Survey score:   __________________________________________________     Alcohol screening (AUDIT) completed -  AUDIT Score: 0  If applicable, date SBIRT discussed in treatment team AND documented:    Tobacco - patient is a smoker: Have You Used Tobacco in the Past 30 Days: Yes  Illegal Drugs use: Have You Used Any Illegal Substances Over the Past 12 Months: No    24 hour chart check complete: yes     _______________________________________________    Patient goal(s) for today: rest, communicate needs to staff, complete ADLs  Treatment team focus/goals: check on pt needs, discuss discharge planning  Progress note: The pt met with treatment team and presented with a sad mood and occasionally teary affect evidenced by her soft, slow, interrupted speech and the pt mostly had her eyes closed during the meeting. The pt denied any SI, but reported feeling like no one is \"rooting for [her]. \" The pt shared that she feels very vulnerable and felt upset by her hallucinations which she referred to as spiritual. The pt shared an anecdote about voices she heard that loosely connected to her friend's baby who later experienced a seizure. The incident caused her friend to fear her, and she felt poorly about herself.  The pt stated she is attempting to cope with her hallucinations coming from a paranoid place. The pt also seemed very concerned about the label \"psychotic. \" She wished her medications were not called anti-psychotics. The [facility] rescheduled her psych appointment that she missed today because being hospitalized. The pt has reported experiencing things that are invasive and flaring up her trauma. The team discussed next steps to connect her to a CSU. Spiritual Care Consult:   Financial concerns/prescription coverage: [facility]  Family contact: Natalya Williamson (Mother), [phone]                       Family requesting physician contact today: NO  Discharge plan:   Access to weapons:                                                              Outpatient provider(s):   Patient's preferred phone number for follow up call: [phone]  Patient's preferred e-mail address: [email or web address]    LOS:  4  Expected LOS: 7-8    Participating treatment team members: Kamilla Owens; ELMA Ibrahim; Mendy Morales NP; Mike Doty RN; SHARIF Kahn

## 2023-03-20 NOTE — BH NOTES
GROUP THERAPY PROGRESS NOTE    Patient did not participate in psychotherapy group.     Kimi WILLS, HP-A

## 2023-03-20 NOTE — BH NOTES
GROUP THERAPY PROGRESS NOTE    Patient did not participate in recreational therapy group.     Debra Petty, MSW, QMHP-A

## 2023-03-20 NOTE — BH NOTES
GROUP THERAPY PROGRESS NOTE    Patient did not participate in Healthy Living and Wellness group.     ELMA Loza, [facility]

## 2023-03-20 NOTE — PROGRESS NOTES
Problem: Depressed Mood (Adult/Pediatric)  Goal: *STG: Participates in treatment plan  Outcome: Progressing Towards Goal  Goal: *STG: Verbalizes anger, guilt, and other feelings in a constructive manor  Outcome: Progressing Towards Goal   Pt is alert and oriented. Pt is calm and cooperative but irritable. Pt is social with peers. Pt is also medication and meal compliant.

## 2023-03-20 NOTE — PROGRESS NOTES
Problem: Depressed Mood (Adult/Pediatric)  Goal: *STG: Participates in treatment plan  Outcome: Progressing Towards Goal  Will attend with active participation  Goal: *STG: Verbalizes anger, guilt, and other feelings in a constructive manor  Outcome:  SLOWLY Progressing Towards Goal  Will express feelings sometimes with volatility  Goal: *STG: Attends activities and groups  Outcome: Progressing Towards Goal  WILL ATTEND AND PARTICIPATE  Goal: *STG: Demonstrates reduction in symptoms and increase in insight into coping skills/future focused  ADMITS TO & /ISOLATIVE TO ROOM AT INTERVALS     Goal: *STG: Remains safe in hospital  Outcome: Progressing Towards Goal   Pt denies any suicidal or homicidal thoughts. Contracts for safety. Remains on q 15 min safety checks.

## 2023-03-20 NOTE — BH NOTES
Chief Complaint:  \"I'm doing OK. \"    Length of Stay: 4 Days    Interval History:  3/20/23Venkat Preston states she is feeling somewhat better. She feels being back on her Buspar has been helpful. Pt continues to exhibit lability, however, and states she is still feeling lonely a nd states she still feels like she doesn't have anyone \"rooting for me. \" She appears to exhibit thought blocking and falls into an irregular speech pattern at times, closing her eyes and speaking in a very high pitched voice at times. She denies SI, but exhibits disorganization and hopelessness. She is resistant to the idea of taking her Risperdal and resistant to the idea that she has a mental illness, and continues to revisit the time she was arrested outside of a [employer] for trespassing and putting \"the blood of [place]\" on herself. She continues to state that the perceptual disturbances she demonstrates are related to a spiritual gift. 3/19/23- Karmen Schilling reports that she has been sleeping a lot today. Denies medication side effects. +AH/VH - \"I don't want to get into it. \" She has been isolative to her room. No acute overnight events are reported. She has been compliant with medications. Past Medical History:  No past medical history on file.       Labs:  Lab Results   Component Value Date/Time    WBC 11.2 (H) 03/16/2023 08:26 PM    HGB 13.6 03/16/2023 08:26 PM    HCT 42.0 03/16/2023 08:26 PM    PLATELET 274 98/02/1593 08:26 PM    MCV 91.1 03/16/2023 08:26 PM      Lab Results   Component Value Date/Time    Sodium 136 03/16/2023 09:53 PM    Potassium 3.3 (L) 03/16/2023 09:53 PM    Chloride 107 03/16/2023 09:53 PM    CO2 24 03/16/2023 09:53 PM    Anion gap 5 03/16/2023 09:53 PM    Glucose 119 (H) 03/16/2023 09:53 PM    BUN 15 03/16/2023 09:53 PM    Creatinine 0.70 03/16/2023 09:53 PM    BUN/Creatinine ratio 21 (H) 03/16/2023 09:53 PM    GFR est AA >60 12/21/2020 11:26 PM    GFR est non-AA >60 12/21/2020 11:26 PM    Calcium 9.2 03/16/2023 09:53 PM    Bilirubin, total 0.5 03/16/2023 09:53 PM    Alk.  phosphatase 34 (L) 03/16/2023 09:53 PM    Protein, total 7.1 03/16/2023 09:53 PM    Albumin 3.6 03/16/2023 09:53 PM    Globulin 3.5 03/16/2023 09:53 PM    A-G Ratio 1.0 (L) 03/16/2023 09:53 PM    ALT (SGPT) 23 03/16/2023 09:53 PM      Vitals:    03/19/23 0716 03/19/23 1117 03/19/23 1544 03/20/23 0720   BP: 107/72 110/75 107/81 118/75   Pulse: 82 98 92 78   Resp: 16 16 16   Temp: 98.4 °F (36.9 °C) 98.9 °F (37.2 °C) 98.9 °F (37.2 °C) 98.2 °F (36.8 °C)   SpO2:  99% 95% 97%   Weight:  85.5 kg (188 lb 6.4 oz)     Height:             Current Facility-Administered Medications   Medication Dose Route Frequency Provider Last Rate Last Admin    OLANZapine (ZyPREXA) tablet 5 mg  5 mg Oral Q6H PRN Kortney No NP        haloperidol lactate (HALDOL) injection 5 mg  5 mg IntraMUSCular Q6H PRN Kortney No NP        benztropine (COGENTIN) tablet 1 mg  1 mg Oral BID PRN Kortney No NP        diphenhydrAMINE (BENADRYL) injection 50 mg  50 mg IntraMUSCular BID PRN Kortney No NP        hydrOXYzine HCL (ATARAX) tablet 50 mg  50 mg Oral TID PRN Kortney No NP   50 mg at 03/18/23 2003    LORazepam (ATIVAN) 2 mg/mL injection 1 mg  1 mg IntraMUSCular Q4H PRN Kortney No NP        traZODone (DESYREL) tablet 50 mg  50 mg Oral QHS PRN Kortney No NP        acetaminophen (TYLENOL) tablet 650 mg  650 mg Oral Q4H PRN Kortney No NP   650 mg at 03/20/23 0852    magnesium hydroxide (MILK OF MAGNESIA) 400 mg/5 mL oral suspension 30 mL  30 mL Oral DAILY PRN Kortney No NP        busPIRone (BUSPAR) tablet 10 mg  10 mg Oral BID Mac Underwood NP   10 mg at 03/20/23 0851    risperiDONE (RisperDAL) tablet 1 mg  1 mg Oral QHS Mac Underwood NP   1 mg at 03/19/23 3682    nicotine buccal (POLACRILEX) lozenge 2 mg  2 mg Oral Q2H PRN Mac Underwood NP   2 mg at 03/20/23 0715     Mental Status Exam:  Eye contact: fair  Grooming: fair  Psychomotor activity: wnl  Speech is spontaneous  Mood is \"OK \"  Affect: labile  Perception: +religiously preoccupied  Suicidal ideation: no SI/plan/intent, no HI/plan/intent  Cognition is grossly intact    Physical Exam:  Body habitus: Body mass index is 31.84 kg/m². Musculoskeletal system: normal gait  Tremor - neg  Cog wheeling - neg    Assessment and Plan:  Farhat Barrera meets criteria for a diagnosis of Unspecified psychosis vs MDD with psychotic features; r/o stimulant use induced psychosis; PTSD. 3/20/23- Repeat CMP tomorrow to follow up on K+ of 3.3 on 3/16, will replete if needed. Recommended increasing Risperdal to 1mg BID, but will keep it at 1mg QHS for now due to pt reluctance. A coordinated, multidisplinary treatment team round was conducted with the patient, nurses, pharmcist,  and writer present. Discussions held with , and/or with family members; Complete current electronic health record for patient was reviewed in full including consultant notes, ancillary staff notes, nurses and tech notes, labs and vitals. I certify that this patients inpatient psychiatric hospital services furnished since the previous certification were, and continue to be, required for treatment that could reasonably be expected to improve the patient's condition, or for diagnostic study, and that the patient continues to need, on a daily basis, active treatment furnished directly by or requiring the supervision of inpatient psychiatric facility personnel. In addition, the hospital records show that services furnished were intensive treatment services, admission or related services, or equivalent services.

## 2023-03-21 LAB
ALBUMIN SERPL-MCNC: 3.5 G/DL (ref 3.5–5)
ALBUMIN/GLOB SERPL: 1.1 (ref 1.1–2.2)
ALP SERPL-CCNC: 33 U/L (ref 45–117)
ALT SERPL-CCNC: 26 U/L (ref 12–78)
ANION GAP SERPL CALC-SCNC: 5 MMOL/L (ref 5–15)
AST SERPL-CCNC: 13 U/L (ref 15–37)
BILIRUB SERPL-MCNC: 0.2 MG/DL (ref 0.2–1)
BUN SERPL-MCNC: 11 MG/DL (ref 6–20)
BUN/CREAT SERPL: 21 (ref 12–20)
CALCIUM SERPL-MCNC: 9 MG/DL (ref 8.5–10.1)
CHLORIDE SERPL-SCNC: 109 MMOL/L (ref 97–108)
CO2 SERPL-SCNC: 22 MMOL/L (ref 21–32)
CREAT SERPL-MCNC: 0.52 MG/DL (ref 0.55–1.02)
GLOBULIN SER CALC-MCNC: 3.1 G/DL (ref 2–4)
GLUCOSE SERPL-MCNC: 99 MG/DL (ref 65–100)
POTASSIUM SERPL-SCNC: 4.1 MMOL/L (ref 3.5–5.1)
PROT SERPL-MCNC: 6.6 G/DL (ref 6.4–8.2)
SODIUM SERPL-SCNC: 136 MMOL/L (ref 136–145)

## 2023-03-21 PROCEDURE — 65220000003 HC RM SEMIPRIVATE PSYCH

## 2023-03-21 PROCEDURE — 80053 COMPREHEN METABOLIC PANEL: CPT

## 2023-03-21 PROCEDURE — 74011250637 HC RX REV CODE- 250/637: Performed by: NURSE PRACTITIONER

## 2023-03-21 PROCEDURE — 36415 COLL VENOUS BLD VENIPUNCTURE: CPT

## 2023-03-21 RX ORDER — ESCITALOPRAM OXALATE 10 MG/1
5 TABLET ORAL DAILY
Status: DISCONTINUED | OUTPATIENT
Start: 2023-03-21 | End: 2023-03-23

## 2023-03-21 RX ORDER — BUSPIRONE HYDROCHLORIDE 5 MG/1
5 TABLET ORAL 2 TIMES DAILY
Status: COMPLETED | OUTPATIENT
Start: 2023-03-21 | End: 2023-03-22

## 2023-03-21 RX ADMIN — BUSPIRONE HYDROCHLORIDE 5 MG: 5 TABLET ORAL at 17:15

## 2023-03-21 RX ADMIN — NICOTINE POLACRILEX 2 MG: 2 LOZENGE ORAL at 19:05

## 2023-03-21 RX ADMIN — BUSPIRONE HYDROCHLORIDE 10 MG: 10 TABLET ORAL at 04:41

## 2023-03-21 RX ADMIN — NICOTINE POLACRILEX 2 MG: 2 LOZENGE ORAL at 11:29

## 2023-03-21 RX ADMIN — TRAZODONE HYDROCHLORIDE 50 MG: 50 TABLET ORAL at 23:10

## 2023-03-21 RX ADMIN — ESCITALOPRAM 5 MG: 10 TABLET, FILM COATED ORAL at 11:26

## 2023-03-21 RX ADMIN — NICOTINE POLACRILEX 2 MG: 2 LOZENGE ORAL at 16:11

## 2023-03-21 NOTE — BH NOTES
GROUP THERAPY PROGRESS NOTE    Patient did not participate in Self-care group.     Alexis Miller MSW, [facility]

## 2023-03-21 NOTE — INTERDISCIPLINARY ROUNDS
Behavioral Health Interdisciplinary Rounds     Patient Name: Jesus Tom  Age: 40 y.o.   Room/Bed:  728/  Primary Diagnosis: <principal problem not specified>   Admission Status: Voluntary     Readmission within 30 days: no  Power of  in place: no  Patient requires a blocked bed: no          Reason for blocked bed:     Sleep hours:  7      Participation in Care/Groups:  yes  Medication Compliant?: Yes  PRNS (last 24 hours): Pain    Restraints (last 24 hours):  no    OQ Admission Analysis Survey completed:  OQ Admission Analysis Survey score:  OQ Discharge Analysis Survey completed:  OQ Discharge Analysis Survey score:       Substance Abuse:      Alcohol screening (AUDIT) completed -  AUDIT Score: 0  If applicable, date SBIRT discussed in treatment team AND documented:   Tobacco -   24 hour chart check complete: yes   ______________________________________    Patient goal(s) for today:   Treatment team focus/goals:   Progress note:         Financial concerns/prescription coverage:    Family contact:                        Family requesting physician contact today:    Discharge plan:   Access to weapons :                                                              Outpatient provider(s):   Patient's preferred phone number for follow up call :   Patient's preferred e-mail address :  Participating treatment team members:    LOS:  5  Expected LOS:     Participating treatment team members: Jesus Tom, * (assigned BRYAN),

## 2023-03-21 NOTE — BH NOTES
GROUP THERAPY PROGRESS NOTE    Patient did not participate in psychotherapy group.     Nikolas WILLS, [facility]

## 2023-03-21 NOTE — BH NOTES
GROUP THERAPY PROGRESS NOTE    Patient did not participate in recreational therapy group.     ELMA Medellin, HP-A

## 2023-03-21 NOTE — INTERDISCIPLINARY ROUNDS
Behavioral Health Interdisciplinary Rounds     Patient Name: Franklin Mitchell  Age: 40 y.o. Room/Bed:  728/  Primary Diagnosis: <principal problem not specified>   Admission Status: Voluntary     Readmission within 30 days: no  Power of  in place: no  Patient requires a blocked bed: no          Reason for blocked bed:     Sleep hours:  7      Participation in Care/Groups:  yes  Medication Compliant?: Yes  PRNS (last 24 hours): Pain    Restraints (last 24 hours):  no    OQ Admission Analysis Survey completed:  OQ Admission Analysis Survey score:  OQ Discharge Analysis Survey completed:  OQ Discharge Analysis Survey score:       Substance Abuse:      Alcohol screening (AUDIT) completed -  AUDIT Score: 0  If applicable, date SBIRT discussed in treatment team AND documented:   Tobacco -   24 hour chart check complete: yes   ______________________________________    Patient goal(s) for today: rest, communicate needs to staff, complete ADLs  Treatment team focus/goals: check on pt needs, discuss discharge planning  Progress note: The pt met with treatment team and presented with a somewhat anxious mood and occasionally tearful affect evidenced by shaking hands. The pt was more energized today than yesterday and had her eyes open for most of the meeting. She still maintained poor eye contact. The pt denied any SI/HI. The NP, pharmacist, and pt engaged in a long discussion about withdrawing from [address] and how she preferred adderall to manage her anxiety. She shared that she felt sedated, but was anxious about how her anxiety would react if she tapered off Buspar without her adderall. The NP reassured her that she was already on a very low dose of Buspar, and coming off it would not be a big deal. The pt agreed to try it and became emotionally charged as she explained how she wished there was more consistent and prevalent Mandaeism components in her treatment here.  The pt said \"it sounds ridiculous, but if there were an exorcist support group, I would go to it. \"        Spiritual Care Consult:   Financial concerns/prescription coverage: [facility]  Family contact: Sarwat Santos (Mother), [phone]                       Family requesting physician contact today: NO  Discharge plan:   Access to weapons:                                                              Outpatient provider(s):   Patient's preferred phone number for follow up call: [phone]  Patient's preferred e-mail address: [email or web address]     LOS:  5                       Expected LOS: 7-8     Participating treatment team members: Yadira Murrell;  Dori Soulier, MSW; Yumiko Mcfadden NP; Jeanette Boyd RN; ANTWON Mcadams; Myriam Harding, LatashaD

## 2023-03-21 NOTE — PROGRESS NOTES
Problem: Depressed Mood (Adult/Pediatric)  Goal: *STG: Verbalizes anger, guilt, and other feelings in a constructive manor  Outcome: Progressing Towards Goal  Interaction with staff has improved  Goal: *STG: Remains safe in hospital  Outcome: Progressing Towards Goal  Pt denies any suicidal or homicidal thoughts. Contracts for safety. Remains on q 15 min safety checks. Goal: *STG: Complies with medication therapy  Outcome: Progressing Towards Goal  Medication compliant denies side effects       Lability persist but reports \"Buspar has helped. \"

## 2023-03-21 NOTE — BH NOTES
Chief Complaint:  \"I'm feeling better. \"    Length of Stay: 5 Days    Interval History:  3/21/23Akbar Sanchez states she is feeling better this morning. She did not take her Atarax yesterday night, and she feels better this morning, less groggy, and was able to shower and eat breakfast earlier than yesterday. She attributes the groggy feeling she's been having recently to the Atarax, and feels better not taking it. Bard Mcmanus continues to express frustration when medications are suggested, as she believes that her issues are spiritual, and does not feel her Sabianism concerns are adequately addressed here, and she is not inclined to take medication for this. She states that she feels ADHD, and her Sabianism trauma secondary to witnessing an \"exorcism,\" are her primary concerns. No SI/plan/intent, no HI/plan/intent. 3/20/23Akbar Sanchez states she is feeling somewhat better. She feels being back on her Buspar has been helpful. Pt continues to exhibit lability, however, and states she is still feeling lonely a nd states she still feels like she doesn't have anyone \"rooting for me. \" She appears to exhibit thought blocking and falls into an irregular speech pattern at times, closing her eyes and speaking in a very high pitched voice at times. She denies SI, but exhibits disorganization and hopelessness. She is resistant to the idea of taking her Risperdal and resistant to the idea that she has a mental illness, and continues to revisit the time she was arrested outside of a [employer] for trespassing and putting \"the blood of [place]\" on herself. She continues to state that the perceptual disturbances she demonstrates are related to a spiritual gift. 3/19/23- Bard Mcmanus reports that she has been sleeping a lot today. Denies medication side effects. +AH/VH - \"I don't want to get into it. \" She has been isolative to her room. No acute overnight events are reported. She has been compliant with medications.     Past Medical History:  No past medical history on file. Labs:  Lab Results   Component Value Date/Time    WBC 11.2 (H) 03/16/2023 08:26 PM    HGB 13.6 03/16/2023 08:26 PM    HCT 42.0 03/16/2023 08:26 PM    PLATELET 329 85/36/2599 08:26 PM    MCV 91.1 03/16/2023 08:26 PM      Lab Results   Component Value Date/Time    Sodium 136 03/21/2023 05:34 AM    Potassium 4.1 03/21/2023 05:34 AM    Chloride 109 (H) 03/21/2023 05:34 AM    CO2 22 03/21/2023 05:34 AM    Anion gap 5 03/21/2023 05:34 AM    Glucose 99 03/21/2023 05:34 AM    BUN 11 03/21/2023 05:34 AM    Creatinine 0.52 (L) 03/21/2023 05:34 AM    BUN/Creatinine ratio 21 (H) 03/21/2023 05:34 AM    GFR est AA >60 12/21/2020 11:26 PM    GFR est non-AA >60 12/21/2020 11:26 PM    Calcium 9.0 03/21/2023 05:34 AM    Bilirubin, total 0.2 03/21/2023 05:34 AM    Alk.  phosphatase 33 (L) 03/21/2023 05:34 AM    Protein, total 6.6 03/21/2023 05:34 AM    Albumin 3.5 03/21/2023 05:34 AM    Globulin 3.1 03/21/2023 05:34 AM    A-G Ratio 1.1 03/21/2023 05:34 AM    ALT (SGPT) 26 03/21/2023 05:34 AM      Vitals:    03/19/23 1544 03/20/23 0720 03/20/23 1513 03/21/23 0728   BP: 107/81 118/75 115/78 131/74   Pulse: 92 78 85 88   Resp: 16 16 16 16   Temp: 98.9 °F (37.2 °C) 98.2 °F (36.8 °C) 98.1 °F (36.7 °C) 99 °F (37.2 °C)   SpO2: 95% 97% 95% 95%   Weight:       Height:             Current Facility-Administered Medications   Medication Dose Route Frequency Provider Last Rate Last Admin    OLANZapine (ZyPREXA) tablet 5 mg  5 mg Oral Q6H PRN Kortney No NP        haloperidol lactate (HALDOL) injection 5 mg  5 mg IntraMUSCular Q6H PRN Kortney No NP        benztropine (COGENTIN) tablet 1 mg  1 mg Oral BID PRN Kortney No NP        diphenhydrAMINE (BENADRYL) injection 50 mg  50 mg IntraMUSCular BID PRN Kortney No NP        hydrOXYzine HCL (ATARAX) tablet 50 mg  50 mg Oral TID PRN Kortney No NP   50 mg at 03/18/23 2003    LORazepam (ATIVAN) 2 mg/mL injection 1 mg  1 mg IntraMUSCular Q4H PRN Kortney No NP        traZODone (DESYREL) tablet 50 mg  50 mg Oral QHS PRN Kortney No NP        acetaminophen (TYLENOL) tablet 650 mg  650 mg Oral Q4H PRN Nory Noanie A, NP   650 mg at 03/20/23 0852    magnesium hydroxide (MILK OF MAGNESIA) 400 mg/5 mL oral suspension 30 mL  30 mL Oral DAILY PRN Kortney No NP        busPIRone (BUSPAR) tablet 10 mg  10 mg Oral BID [facility], NP   10 mg at 03/21/23 0441    risperiDONE (RisperDAL) tablet 1 mg  1 mg Oral QHS Frio Augie, NP   1 mg at 03/19/23 2159    nicotine buccal (POLACRILEX) lozenge 2 mg  2 mg Oral Q2H PRN [facility], NP   2 mg at 03/20/23 1944     Mental Status Exam:  Eye contact: fair  Grooming: fair  Psychomotor activity: wnl  Speech is spontaneous  Mood is \"better \"  Affect: labile  Perception: +religiously preoccupied  Suicidal ideation: no SI/plan/intent, no HI/plan/intent  Cognition is grossly intact    Physical Exam:  Body habitus: Body mass index is 31.84 kg/m². Musculoskeletal system: normal gait  Tremor - neg  Cog wheeling - neg    Assessment and Plan:  Rabia Barrera meets criteria for a diagnosis of Unspecified psychosis vs MDD with psychotic features; r/o stimulant use induced psychosis; PTSD.     3/21/23- K+ improved at 4.1 this morning. Discontinuing Risperdal as pt has not been taking. Starting Lexapro 5mg daily as pt is not willing to take an antipsychotic, though this remains my recommendation due to continued disorganization of thought and Christian preoccupation. Tapering off Buspar, 5mg BID x 3 doses, then discontinue. 3/20/23- Repeat CMP tomorrow to follow up on K+ of 3.3 on 3/16, will replete if needed. Recommended increasing Risperdal to 1mg BID, but will keep it at 1mg QHS for now due to pt reluctance. A coordinated, multidisplinary treatment team round was conducted with the patient, nurses, pharmcist,  and writer present.  Discussions held with , and/or with family members; Complete current electronic health record for patient was reviewed in full including consultant notes, ancillary staff notes, nurses and tech notes, labs and vitals. I certify that this patients inpatient psychiatric hospital services furnished since the previous certification were, and continue to be, required for treatment that could reasonably be expected to improve the patient's condition, or for diagnostic study, and that the patient continues to need, on a daily basis, active treatment furnished directly by or requiring the supervision of inpatient psychiatric facility personnel. In addition, the hospital records show that services furnished were intensive treatment services, admission or related services, or equivalent services.

## 2023-03-21 NOTE — PROGRESS NOTES
Problem: Falls - Risk of  Goal: *Absence of Falls  Description: Document [facility] Fall Risk and appropriate interventions in the flowsheet. Outcome: Progressing Towards Goal  Note: Fall Risk Interventions:     Medication Interventions: Teach patient to arise slowly     Problem: Depressed Mood (Adult/Pediatric)  Goal: *STG: Remains safe in hospital  Outcome: Progressing Towards Goal     Patient received resting quietly in bed. No signs of distress. Even and unlabored breathing. Staff will continue to monitor safety q15 and provide support.

## 2023-03-21 NOTE — PROGRESS NOTES
Problem: Depressed Mood (Adult/Pediatric)  Goal: *STG: Participates in treatment plan  Outcome: Progressing Towards Goal  Goal: *STG: Attends activities and groups  Outcome: Progressing Towards Goal   Pt alert and oriented. Pt calm and cooperative. Pt meal and medication compliant. Staff will continue to monitor pt q15 for safety.

## 2023-03-22 PROCEDURE — 74011250637 HC RX REV CODE- 250/637: Performed by: NURSE PRACTITIONER

## 2023-03-22 PROCEDURE — 65220000003 HC RM SEMIPRIVATE PSYCH

## 2023-03-22 RX ADMIN — NICOTINE POLACRILEX 2 MG: 2 LOZENGE ORAL at 08:16

## 2023-03-22 RX ADMIN — ESCITALOPRAM 5 MG: 10 TABLET, FILM COATED ORAL at 08:14

## 2023-03-22 RX ADMIN — BUSPIRONE HYDROCHLORIDE 5 MG: 5 TABLET ORAL at 17:20

## 2023-03-22 RX ADMIN — NICOTINE POLACRILEX 2 MG: 2 LOZENGE ORAL at 18:00

## 2023-03-22 RX ADMIN — HYDROXYZINE HYDROCHLORIDE 50 MG: 50 TABLET, FILM COATED ORAL at 02:04

## 2023-03-22 RX ADMIN — BUSPIRONE HYDROCHLORIDE 5 MG: 5 TABLET ORAL at 08:14

## 2023-03-22 RX ADMIN — NICOTINE POLACRILEX 2 MG: 2 LOZENGE ORAL at 19:50

## 2023-03-22 RX ADMIN — NICOTINE POLACRILEX 2 MG: 2 LOZENGE ORAL at 13:01

## 2023-03-22 RX ADMIN — NICOTINE POLACRILEX 2 MG: 2 LOZENGE ORAL at 15:32

## 2023-03-22 NOTE — BH NOTES
GROUP THERAPY PROGRESS NOTE    Patient is participating in psychotherapy group. Group time: 30 minutes    Personal goal for participation: to develop an understanding of cognitive distortions and how to alter our thinking. Goal orientation: Personal    Group therapy participation:  Passive    Therapeutic interventions reviewed and discussed:  Group members were guided through learning about cognitive distortions. Members gained an understanding of how these types of distortions effect perception, relationships, and overall mental health. Members were guided through learning about methods that can be used for changing negative thought patterns. Members were able to identify distortions and engage in discussion about past experiences. Handout provided. Impression of participation:  Pts were unwilling to meet as a group. SW provided subject material to be reviewed individually.        ELMA Canela, HP-A

## 2023-03-22 NOTE — PROGRESS NOTES
Referral source:  initiated   Benjamin Sethi at [facility] in [facility]. I reviewed the medical record prior to this encounter. I consulted with the treatment team before and after this encounter. Spiritual Assessment: Pt seems to be experiencing a spiritual struggle and has been utilizing maladaptive spiritual resources based on her concept of God and spiritual orientation    Willem Barrera shared the reason for this admission. We discussed her spiritual/Hoahaoism background, beliefs and spiritual practices. Mishel Martinez stated that she feels spiritually oppressed. She discussed feeling traumatized after watching her former spouse's \"exorcism\", which she reports was initiated by a  during a couple's counseling session. She became tearful as she shared this. She confirmed neither she, her former spouse, or the  were Sikhism. I empathized with her experience and offered basic education about when/how the [employer] (sparingly) uses exorcism as a spiritual intervention. Outcome: Benjamin Sethi was able to share her story in a supportive environment. She expressed appreciation our visit. She seemed receptive to adhering to the treatment team's plan of care for emotional/social interventions. Plan of Care: Ongoing support during rounds; I will investigate other local spiritual counseling resources and report back to team     Rev.  Brice Scales MDiv, MS, [facility]  Staff

## 2023-03-22 NOTE — INTERDISCIPLINARY ROUNDS
Behavioral Health Interdisciplinary Rounds     Patient Name: Benjamin Sethi  Age: 40 y.o. Room/Bed:  728/01  Primary Diagnosis: <principal problem not specified>   Admission Status: Voluntary     Readmission within 30 days: no  Power of  in place: no  Patient requires a blocked bed: no          Reason for blocked bed:     Sleep hours:        Participation in Care/Groups:  yes  Medication Compliant?: Yes  PRNS (last 24 hours): Antianxiety    Restraints (last 24 hours):  no    OQ Admission Analysis Survey completed:  OQ Admission Analysis Survey score:  OQ Discharge Analysis Survey completed:  OQ Discharge Analysis Survey score:       Substance Abuse:      Alcohol screening (AUDIT) completed -  AUDIT Score: 0  If applicable, date SBIRT discussed in treatment team AND documented:   Tobacco -   24 hour chart check complete:    ______________________________________    Patient goal(s) for today:   Treatment team focus/goals:   Progress note: Patient met with pastoral care prior to treatment team and leela consulted with treatment team regarding evaluation. [facility] feels patient is experiencing a spiritual crisis and feels the patient is having a negative perception/relationship with God that is impacting her mental health. Patient endorsing thoughts of \"the enemy is after me\" and that she is \"oppressed\". [place] to continue to follow and contact for spiritual resources/supports in the community. Patient met with treatment team and appeared with a calm and pleasant mood and affect. Patient denies SI/HI and [facility], reports poor sleep and anxiety due to issues with roommate. NP to address medications, SW to continue to work on discharge plan with expected discharge on Friday. Patient requesting new room, treatment team to evaluate to determine if she is appropriate for general unit.         Financial concerns/prescription coverage:    Family contact:                        Family requesting physician contact today:    Discharge plan:   Access to weapons :                                                              Outpatient provider(s):   Patient's preferred phone number for follow up call :   Patient's preferred e-mail address :  Participating treatment team members:    LOS:  6  Expected LOS: 8    Participating treatment team members: ELMA Nava, Glenda Crawford RN, William Frazier NP, Araceli PaganD

## 2023-03-22 NOTE — PROGRESS NOTES
Problem: Falls - Risk of  Goal: *Absence of Falls  Description: Document Magalys Roth Fall Risk and appropriate interventions in the flowsheet.   Outcome: Progressing Towards Goal  Note: Fall Risk Interventions:            Medication Interventions: Teach patient to arise slowly       Will continue q 15 minute safety check

## 2023-03-22 NOTE — BH NOTES
PRN Medication Documentation    Specific patient behavior that led to need for PRN medication: pt c/o anxiety upon awaking   Staff interventions attempted prior to PRN being given: therapeutic communication  PRN medication given: atarax 50 mg po  Patient response/effectiveness of PRN medication: staff will continue to monitor and support

## 2023-03-22 NOTE — BH NOTES
Chief Complaint:  \"I'm OK. \"    Length of Stay: 6 Days    Interval History:  3/22/23Akbar Sanchez met with the  today, which was very helpful for for her, and she is interested in pursuing spiritual counseling when she discharges. However, Bard Mcmanus is struggling with the other patients and is having a hard time with her roommate. She had a hard time sleeping last night, and stated she needed her trazodone and her Atarax last night, though this was related to her roommate. 3/21/23Akbar Bowdenaleks states she is feeling better this morning. She did not take her Atarax yesterday night, and she feels better this morning, less groggy, and was able to shower and eat breakfast earlier than yesterday. She attributes the groggy feeling she's been having recently to the Atarax, and feels better not taking it. Bard Mcmanus continues to express frustration when medications are suggested, as she believes that her issues are spiritual, and does not feel her Muslim concerns are adequately addressed here, and she is not inclined to take medication for this. She states that she feels ADHD, and her Muslim trauma secondary to witnessing an \"exorcism,\" are her primary concerns. No SI/plan/intent, no HI/plan/intent. 3/20/23Renitail Dennisealeks states she is feeling somewhat better. She feels being back on her Buspar has been helpful. Pt continues to exhibit lability, however, and states she is still feeling lonely a nd states she still feels like she doesn't have anyone \"rooting for me. \" She appears to exhibit thought blocking and falls into an irregular speech pattern at times, closing her eyes and speaking in a very high pitched voice at times. She denies SI, but exhibits disorganization and hopelessness. She is resistant to the idea of taking her Risperdal and resistant to the idea that she has a mental illness, and continues to revisit the time she was arrested outside of a [employer] for trespassing and putting \"the blood of [place]\" on herself.  She continues to state that the perceptual disturbances she demonstrates are related to a spiritual gift. 3/19/23- Truong Chun reports that she has been sleeping a lot today. Denies medication side effects. +AH/VH - \"I don't want to get into it. \" She has been isolative to her room. No acute overnight events are reported. She has been compliant with medications. Past Medical History:  No past medical history on file. Labs:  Lab Results   Component Value Date/Time    WBC 11.2 (H) 03/16/2023 08:26 PM    HGB 13.6 03/16/2023 08:26 PM    HCT 42.0 03/16/2023 08:26 PM    PLATELET 835 19/06/6652 08:26 PM    MCV 91.1 03/16/2023 08:26 PM      Lab Results   Component Value Date/Time    Sodium 136 03/21/2023 05:34 AM    Potassium 4.1 03/21/2023 05:34 AM    Chloride 109 (H) 03/21/2023 05:34 AM    CO2 22 03/21/2023 05:34 AM    Anion gap 5 03/21/2023 05:34 AM    Glucose 99 03/21/2023 05:34 AM    BUN 11 03/21/2023 05:34 AM    Creatinine 0.52 (L) 03/21/2023 05:34 AM    BUN/Creatinine ratio 21 (H) 03/21/2023 05:34 AM    GFR est AA >60 12/21/2020 11:26 PM    GFR est non-AA >60 12/21/2020 11:26 PM    Calcium 9.0 03/21/2023 05:34 AM    Bilirubin, total 0.2 03/21/2023 05:34 AM    Alk.  phosphatase 33 (L) 03/21/2023 05:34 AM    Protein, total 6.6 03/21/2023 05:34 AM    Albumin 3.5 03/21/2023 05:34 AM    Globulin 3.1 03/21/2023 05:34 AM    A-G Ratio 1.1 03/21/2023 05:34 AM    ALT (SGPT) 26 03/21/2023 05:34 AM      Vitals:    03/21/23 0728 03/21/23 1530 03/21/23 2002 03/22/23 0727   BP: 131/74 (!) 124/90 127/69 125/89   Pulse: 88 (!) 104 88 93   Resp: 16 16 16 16   Temp: 99 °F (37.2 °C) 98.4 °F (36.9 °C)  98.5 °F (36.9 °C)   SpO2: 95% 95% 95% 96%   Weight:       Height:             Current Facility-Administered Medications   Medication Dose Route Frequency Provider Last Rate Last Admin    busPIRone (BUSPAR) tablet 5 mg  5 mg Oral BID Mac Underwood NP   5 mg at 03/22/23 0814    escitalopram oxalate (LEXAPRO) tablet 5 mg  5 mg Oral DAILY Micheal Merchant, NP   5 mg at 03/22/23 0814    OLANZapine (ZyPREXA) tablet 5 mg  5 mg Oral Q6H PRN Kortney No NP        haloperidol lactate (HALDOL) injection 5 mg  5 mg IntraMUSCular Q6H PRN Kortney No NP        benztropine (COGENTIN) tablet 1 mg  1 mg Oral BID PRN Kortney No NP        diphenhydrAMINE (BENADRYL) injection 50 mg  50 mg IntraMUSCular BID PRN Kortney No NP        hydrOXYzine HCL (ATARAX) tablet 50 mg  50 mg Oral TID PRN Kortney No NP   50 mg at 03/22/23 0204    LORazepam (ATIVAN) 2 mg/mL injection 1 mg  1 mg IntraMUSCular Q4H PRN Kortney oN NP        traZODone (DESYREL) tablet 50 mg  50 mg Oral QHS PRN Kortney No NP   50 mg at 03/21/23 2310    acetaminophen (TYLENOL) tablet 650 mg  650 mg Oral Q4H PRN Kortney No NP   650 mg at 03/20/23 0852    magnesium hydroxide (MILK OF MAGNESIA) 400 mg/5 mL oral suspension 30 mL  30 mL Oral DAILY PRN Kortney No NP        nicotine buccal (POLACRILEX) lozenge 2 mg  2 mg Oral Q2H PRN Micheal Merchant NP   2 mg at 03/22/23 0816     Mental Status Exam:  Eye contact: fair  Grooming: fair  Psychomotor activity: wnl  Speech is spontaneous  Mood is \"OK \"  Affect: full  Perception: +religiously preoccupied  Suicidal ideation: no SI/plan/intent, no HI/plan/intent  Cognition is grossly intact    Physical Exam:  Body habitus: Body mass index is 31.84 kg/m². Musculoskeletal system: normal gait  Tremor - neg  Cog wheeling - neg    Assessment and Plan:  Renaldo Barrera meets criteria for a diagnosis of Unspecified psychosis vs MDD with psychotic features; r/o stimulant use induced psychosis; PTSD.     3/22/23- Continue medications, last Buspar does this evening. Discharge Friday to [facility].     3/21/23- K+ improved at 4.1 this morning. Discontinuing Risperdal as pt has not been taking.  Starting Lexapro 5mg daily as pt is not willing to take an antipsychotic, though this remains my recommendation due to continued disorganization of thought and Amish preoccupation. Tapering off Buspar, 5mg BID x 3 doses, then discontinue. 3/20/23- Repeat CMP tomorrow to follow up on K+ of 3.3 on 3/16, will replete if needed. Recommended increasing Risperdal to 1mg BID, but will keep it at 1mg QHS for now due to pt reluctance. A coordinated, multidisplinary treatment team round was conducted with the patient, nurses, pharmcist,  and writer present. Discussions held with , and/or with family members; Complete current electronic health record for patient was reviewed in full including consultant notes, ancillary staff notes, nurses and tech notes, labs and vitals. I certify that this patients inpatient psychiatric hospital services furnished since the previous certification were, and continue to be, required for treatment that could reasonably be expected to improve the patient's condition, or for diagnostic study, and that the patient continues to need, on a daily basis, active treatment furnished directly by or requiring the supervision of inpatient psychiatric facility personnel. In addition, the hospital records show that services furnished were intensive treatment services, admission or related services, or equivalent services.

## 2023-03-22 NOTE — BH NOTES
GROUP THERAPY PROGRESS NOTE    Patient did not participate in recreational therapy group.     ELMA Looney, HP-A

## 2023-03-22 NOTE — BH NOTES
GROUP THERAPY PROGRESS NOTE    Patient is participating in Coping Skills group. Group time: 45 minutes    Personal goal for participation: To develop an understanding of emotional wellness    Goal orientation: Personal    Group therapy participation: active     Therapeutic interventions reviewed and discussed:  Group members were introduced to the eight dimensions of wellness, specifically, emotional wellness. Members were able to measure their emotional wellness to discover areas for improvement. Handout provided. Impression of participation: Pt was present and engaged in group discussion. Pt participated in activity and interacted with SW and peers. Pt was calm, cooperative.        ELMA Wei, HP-A

## 2023-03-22 NOTE — PROGRESS NOTES
Problem: Depressed Mood (Adult/Pediatric)  Goal: *STG: Participates in treatment plan  Outcome: Progressing Towards Goal  WILL ATTEND WITH APPROPRIATE LEVEL OF INTERACTION  Goal: *STG: Verbalizes anger, guilt, and other feelings in a constructive manor  Outcome: Progressing Towards Goal  HAS BEEN POLITE AND COOPERATIVE DURING INTERACTION  Goal: *STG: Remains safe in hospital  Outcome: Progressing Towards Goal  Pt denies any suicidal or homicidal thoughts. Contracts for safety. Remains on q 15 min safety checks.

## 2023-03-23 PROCEDURE — 74011250637 HC RX REV CODE- 250/637: Performed by: NURSE PRACTITIONER

## 2023-03-23 PROCEDURE — 65220000003 HC RM SEMIPRIVATE PSYCH

## 2023-03-23 RX ORDER — ESCITALOPRAM OXALATE 10 MG/1
10 TABLET ORAL DAILY
Status: DISCONTINUED | OUTPATIENT
Start: 2023-03-24 | End: 2023-03-24 | Stop reason: HOSPADM

## 2023-03-23 RX ORDER — ESCITALOPRAM OXALATE 10 MG/1
5 TABLET ORAL ONCE
Status: COMPLETED | OUTPATIENT
Start: 2023-03-23 | End: 2023-03-23

## 2023-03-23 RX ADMIN — ESCITALOPRAM 5 MG: 10 TABLET, FILM COATED ORAL at 08:20

## 2023-03-23 RX ADMIN — NICOTINE POLACRILEX 2 MG: 2 LOZENGE ORAL at 08:21

## 2023-03-23 RX ADMIN — ACETAMINOPHEN 650 MG: 325 TABLET ORAL at 02:38

## 2023-03-23 RX ADMIN — ESCITALOPRAM 5 MG: 10 TABLET, FILM COATED ORAL at 10:45

## 2023-03-23 RX ADMIN — NICOTINE POLACRILEX 2 MG: 2 LOZENGE ORAL at 12:32

## 2023-03-23 RX ADMIN — NICOTINE POLACRILEX 2 MG: 2 LOZENGE ORAL at 17:46

## 2023-03-23 RX ADMIN — NICOTINE POLACRILEX 2 MG: 2 LOZENGE ORAL at 15:52

## 2023-03-23 NOTE — PROGRESS NOTES
Rec'd patient this am resting in bed quietly. She has been engaged on the unit w/ peers. She is calm, cooperative, subdued and pleasant. Pt reports sleeping well, feeling \"anxious\" but not \"panicky\". Sleeping well, calm, cooperative, pleasant. Denies S/I, H/I, A/H and V/H.  Med/meal compliant. Problem: Falls - Risk of  Goal: *Absence of Falls  Description: Document [facility] Fall Risk and appropriate interventions in the flowsheet.   Outcome: Progressing Towards Goal  Note: Fall Risk Interventions:   Medication Interventions: Teach patient to arise slowly  Problem: Patient Education: Go to Patient Education Activity  Goal: Patient/Family Education  Outcome: Progressing Towards Goal  Problem: Depressed Mood (Adult/Pediatric)  Goal: *STG: Remains safe in hospital  Outcome: Progressing Towards Goal

## 2023-03-23 NOTE — BH NOTES
Chief Complaint:  \"I feel antsy to get out of here. \"    Length of Stay: 7 Days    Interval History:  3/23/23Havervee Odom states she slept well last night, and states she slept without any medications, states it was a natural sleep. She reports she slept about 8 hours in total. She states that she is feeling antsy to get of the hospital, because she doesn't feel she has a purpose here. Her short term goal is finding a place to live, and she is not sure at this time what her more long term goals are; she states she is not allowing herself to think about her long term goals because right now, she is just focused on the short term goal. She denies SI/HI/AVH. She is tolerating her Lexapro well and the discontinuation of the Buspar well.     3/22/23Havery Ilene met with the  today, which was very helpful for for her, and she is interested in pursuing spiritual counseling when she discharges. However, Truong Chun is struggling with the other patients and is having a hard time with her roommate. She had a hard time sleeping last night, and stated she needed her trazodone and her Atarax last night, though this was related to her roommate. 3/21/23Havery Ilene states she is feeling better this morning. She did not take her Atarax yesterday night, and she feels better this morning, less groggy, and was able to shower and eat breakfast earlier than yesterday. She attributes the groggy feeling she's been having recently to the Atarax, and feels better not taking it. Truong Chun continues to express frustration when medications are suggested, as she believes that her issues are spiritual, and does not feel her Yarsani concerns are adequately addressed here, and she is not inclined to take medication for this. She states that she feels ADHD, and her Yarsani trauma secondary to witnessing an \"exorcism,\" are her primary concerns. No SI/plan/intent, no HI/plan/intent. 3/20/23Havery Ilene states she is feeling somewhat better.  She feels being back on her Buspar has been helpful. Pt continues to exhibit lability, however, and states she is still feeling lonely a nd states she still feels like she doesn't have anyone \"rooting for me. \" She appears to exhibit thought blocking and falls into an irregular speech pattern at times, closing her eyes and speaking in a very high pitched voice at times. She denies SI, but exhibits disorganization and hopelessness. She is resistant to the idea of taking her Risperdal and resistant to the idea that she has a mental illness, and continues to revisit the time she was arrested outside of a [employer] for trespassing and putting \"the blood of [place]\" on herself. She continues to state that the perceptual disturbances she demonstrates are related to a spiritual gift. 3/19/[address] reports that she has been sleeping a lot today. Denies medication side effects. +AH/VH - \"I don't want to get into it. \" She has been isolative to her room. No acute overnight events are reported. She has been compliant with medications. Past Medical History:  No past medical history on file. Labs:  Lab Results   Component Value Date/Time    WBC 11.2 (H) 03/16/2023 08:26 PM    HGB 13.6 03/16/2023 08:26 PM    HCT 42.0 03/16/2023 08:26 PM    PLATELET 877 22/92/3878 08:26 PM    MCV 91.1 03/16/2023 08:26 PM      Lab Results   Component Value Date/Time    Sodium 136 03/21/2023 05:34 AM    Potassium 4.1 03/21/2023 05:34 AM    Chloride 109 (H) 03/21/2023 05:34 AM    CO2 22 03/21/2023 05:34 AM    Anion gap 5 03/21/2023 05:34 AM    Glucose 99 03/21/2023 05:34 AM    BUN 11 03/21/2023 05:34 AM    Creatinine 0.52 (L) 03/21/2023 05:34 AM    BUN/Creatinine ratio 21 (H) 03/21/2023 05:34 AM    GFR est AA >60 12/21/2020 11:26 PM    GFR est non-AA >60 12/21/2020 11:26 PM    Calcium 9.0 03/21/2023 05:34 AM    Bilirubin, total 0.2 03/21/2023 05:34 AM    Alk.  phosphatase 33 (L) 03/21/2023 05:34 AM    Protein, total 6.6 03/21/2023 05:34 AM    Albumin 3.5 03/21/2023 05:34 AM    Globulin 3.1 03/21/2023 05:34 AM    A-G Ratio 1.1 03/21/2023 05:34 AM    ALT (SGPT) 26 03/21/2023 05:34 AM      Vitals:    03/21/23 2002 03/22/23 0727 03/22/23 1539 03/23/23 0803   BP: 127/69 125/89 119/76 123/86   Pulse: 88 93 86 76   Resp: 16 16 16 15   Temp:  98.5 °F (36.9 °C) 98.2 °F (36.8 °C) 99.2 °F (37.3 °C)   SpO2: 95% 96% 97% 95%   Weight:       Height:             Current Facility-Administered Medications   Medication Dose Route Frequency Provider Last Rate Last Admin    escitalopram oxalate (LEXAPRO) tablet 5 mg  5 mg Oral DAILY Mary Ghosh NP   5 mg at 03/23/23 0820    OLANZapine (ZyPREXA) tablet 5 mg  5 mg Oral Q6H PRN Kortney No NP        haloperidol lactate (HALDOL) injection 5 mg  5 mg IntraMUSCular Q6H PRN Kortney No NP        benztropine (COGENTIN) tablet 1 mg  1 mg Oral BID PRN Kortney No NP        diphenhydrAMINE (BENADRYL) injection 50 mg  50 mg IntraMUSCular BID PRN Kortney No NP        hydrOXYzine HCL (ATARAX) tablet 50 mg  50 mg Oral TID PRN Kortney No NP   50 mg at 03/22/23 0204    LORazepam (ATIVAN) 2 mg/mL injection 1 mg  1 mg IntraMUSCular Q4H PRN Kortney No NP        traZODone (DESYREL) tablet 50 mg  50 mg Oral QHS PRN Kortney No NP   50 mg at 03/21/23 2310    acetaminophen (TYLENOL) tablet 650 mg  650 mg Oral Q4H PRN Kortney No NP   650 mg at 03/23/23 0238    magnesium hydroxide (MILK OF MAGNESIA) 400 mg/5 mL oral suspension 30 mL  30 mL Oral DAILY PRN Kortney No NP        nicotine buccal (POLACRILEX) lozenge 2 mg  2 mg Oral Q2H PRN Betheljian Ghosh, NP   2 mg at 03/23/23 9714     Mental Status Exam:  Eye contact: fair  Grooming: fair  Psychomotor activity: wnl  Speech is spontaneous  Mood is \"OK \"  Affect: guarded  Perception: +religiously preoccupied  Suicidal ideation: no SI/plan/intent, no HI/plan/intent  Cognition is grossly intact    Physical Exam:  Body habitus: Body mass index is 31.84 kg/m². Musculoskeletal system: normal gait  Tremor - neg  Cog wheeling - neg    Assessment and Plan:  Leslie Barrera meets criteria for a diagnosis of Unspecified psychosis vs MDD with psychotic features; r/o stimulant use induced psychosis; PTSD, r/o schizotypal personality disorder    3/23/23- Increasing Lexapro to 10mg, discharge to [facility] tomorrow    3/22/23- Continue medications, last Buspar does this evening. Discharge Friday to [facility].     3/21/23- K+ improved at 4.1 this morning. Discontinuing Risperdal as pt has not been taking. Starting Lexapro 5mg daily as pt is not willing to take an antipsychotic, though this remains my recommendation due to continued disorganization of thought and Confucianism preoccupation. Tapering off Buspar, 5mg BID x 3 doses, then discontinue. 3/20/23- Repeat CMP tomorrow to follow up on K+ of 3.3 on 3/16, will replete if needed. Recommended increasing Risperdal to 1mg BID, but will keep it at 1mg QHS for now due to pt reluctance. A coordinated, multidisplinary treatment team round was conducted with the patient, nurses, pharmcist,  and writer present. Discussions held with , and/or with family members; Complete current electronic health record for patient was reviewed in full including consultant notes, ancillary staff notes, nurses and tech notes, labs and vitals. I certify that this patients inpatient psychiatric hospital services furnished since the previous certification were, and continue to be, required for treatment that could reasonably be expected to improve the patient's condition, or for diagnostic study, and that the patient continues to need, on a daily basis, active treatment furnished directly by or requiring the supervision of inpatient psychiatric facility personnel.  In addition, the hospital records show that services furnished were intensive treatment services, admission or related services, or equivalent services.

## 2023-03-23 NOTE — PROGRESS NOTES
Referral source: Patient request  Ranulfo Torres at [facility] in [facility]. I reviewed the medical record prior to this encounter. I held space for [facility] to share her spiritual crises; struggle with homelessness, family discord, and desperation. I offered empathic listening, spiritual counseling; affirmed farheen, and offered words of hope. I provided two community ministry resources. Outcome: Ranulfo Torres stated appreciation for having someone \"witness\" her story and experience. Plan of Care: Patient is aware of  availability and was encouraged to request support as needed. The  on-call can be reached at ([phone]). Rev.  Pamela Briseno MDiv, MS, [facility]  Staff

## 2023-03-23 NOTE — PROGRESS NOTES
Problem: Falls - Risk of  Goal: *Absence of Falls  Description: Document Yfn Simpson Fall Risk and appropriate interventions in the flowsheet. Outcome: Progressing Towards Goal  Note: Fall Risk Interventions:            Medication Interventions: Teach patient to arise slowly    Resting in bed with eyes closed, no complaints, no distress noted. Safety measures in place, will continue to monitor.

## 2023-03-23 NOTE — PROGRESS NOTES
Spirituality Group      Meeting Topic: Power of Prayer and Meditation    Meeting Time:  45-60 minutes    Meeting Goal: Participants will increase their awareness of prayer and meditation and explore the function of each in their personal spiritual lives. Meeting Outcome: Participants will engage in self-reflection, community building, and incorporating spiritual practices into their healing journey. Momo Cronin briefly attended but did not participate. For additional spiritual care, please contact the  on-call at ([phone]). Rev.  Ely Gordon MDiv, MS, [facility]  Staff

## 2023-03-23 NOTE — INTERDISCIPLINARY ROUNDS
Behavioral Health Interdisciplinary Rounds     Patient Name: Michael Samayoa  Age: 40 y.o.   Room/Bed:  [facility]  Primary Diagnosis: <principal problem not specified>   Admission Status: Voluntary     Readmission within 30 days: no  Power of  in place: no  Patient requires a blocked bed: no          Reason for blocked bed:   Sleep hours:  7      Participation in Care/Groups:    Medication Compliant?: Yes  PRNS (last 24 hours): None    Restraints (last 24 hours):  no  __________________________________________________  OQ Admission Analysis Survey completed:  OQ Admission Analysis Survey score:  OQ Discharge Analysis Survey completed:  OQ Discharge Analysis Survey score:   __________________________________________________     Alcohol screening (AUDIT) completed -  AUDIT Score: 0  If applicable, date SBIRT discussed in treatment team AND documented:    Tobacco - patient is a smoker: Have You Used Tobacco in the Past 30 Days: Yes  Illegal Drugs use: Have You Used Any Illegal Substances Over the Past 12 Months: No    24 hour chart check complete: yes     _______________________________________________    Patient goal(s) for today:   Treatment team focus/goals:   Progress note:      Spiritual Care Consult:   Financial concerns/prescription coverage:    Family contact:                        Family requesting physician contact today:    Discharge plan:   Access to weapons :                                                              Outpatient provider(s):   Patient's preferred phone number for follow up call :   Patient's preferred e-mail address :    LOS:  7  Expected LOS:     Participating treatment team members: Michael Samayoa, * (assigned SW),

## 2023-03-23 NOTE — INTERDISCIPLINARY ROUNDS
Behavioral Health Interdisciplinary Rounds     Patient Name: Kavon Raymond  Age: 40 y.o. Room/Bed:  728/  Primary Diagnosis: <principal problem not specified>   Admission Status: Voluntary     Readmission within 30 days: no  Power of  in place: no  Patient requires a blocked bed: no          Reason for blocked bed:     Sleep hours:        Participation in Care/Groups:  yes  Medication Compliant?: Yes  PRNS (last 24 hours): Antianxiety    Restraints (last 24 hours):  no    OQ Admission Analysis Survey completed:  OQ Admission Analysis Survey score:  OQ Discharge Analysis Survey completed:  OQ Discharge Analysis Survey score:       Substance Abuse:      Alcohol screening (AUDIT) completed -  AUDIT Score: 0  If applicable, date SBIRT discussed in treatment team AND documented:   Tobacco -   24 hour chart check complete:    ______________________________________    Patient goal(s) for today:   Treatment team focus/goals:   Progress note: Patient met with treatment team and expressed concerns about continuing treatment as she is anxious about where she plans to live prior to discharge. She expresses that it is hard to concentration on treatment and does not see any goals for herself. PT seems to be preoccupied with other thoughts during treatment team meetings. SW to follow up with PT to discuss outpatient treatment options.          Financial concerns/prescription coverage:    Family contact:                        Family requesting physician contact today:    Discharge plan:   Access to weapons :                                                              Outpatient provider(s):   Patient's preferred phone number for follow up call :   Patient's preferred e-mail address :  Participating treatment team members:    LOS:  7  Expected LOS: 8    Participating treatment team members: Kavon RaymondAlexei MSW, Jessica Mccormick, RN, Sayra Prado, KARL, Severiano Fate PharmD, Kamaljit Tobin MSW Student

## 2023-03-23 NOTE — BH NOTES
GROUP THERAPY PROGRESS NOTE    Patient is participating in Healthy living and wellness group. Group time: 45 minutes    Personal goal for participation: Develop an understanding of sleep hygiene    Goal orientation: Personal    Group therapy participation: Active     Therapeutic interventions reviewed and discussed: Group members were able to develop an understanding of how sleep patterns effect mental health. Members were guided through developing an understanding of sleep hygiene. Members gained insight through discussion about current maladaptive sleep habits and ways to improve sleep quality. Sleep hygiene guideline worksheet provided. Impression of participation: Pt was present and engaged in group discussion. Pt added insight to group topic. Pt asked relevant questions. Pt was calm, cooperative.         Valeria WILLS, QMHP-A

## 2023-03-23 NOTE — PROGRESS NOTES
Problem: Depressed Mood (Adult/Pediatric)  Goal: *STG: Participates in treatment plan  Outcome: Progressing Towards Goal  Goal: *STG: Verbalizes anger, guilt, and other feelings in a constructive manor  Outcome: Progressing Towards Goal  Goal: *STG: Attends activities and groups  Outcome: Progressing Towards Goal  Goal: *STG: Remains safe in hospital  Outcome: Progressing Towards Goal  Goal: *STG: Complies with medication therapy  Outcome: Progressing Towards Goal   No voiced complaints of increased sx of depression  Note no acute distress at present

## 2023-03-24 VITALS
WEIGHT: 188.4 LBS | SYSTOLIC BLOOD PRESSURE: 127 MMHG | TEMPERATURE: 97.3 F | RESPIRATION RATE: 16 BRPM | OXYGEN SATURATION: 97 % | HEART RATE: 89 BPM | DIASTOLIC BLOOD PRESSURE: 90 MMHG | BODY MASS INDEX: 31.39 KG/M2 | HEIGHT: 65 IN

## 2023-03-24 PROCEDURE — 74011250637 HC RX REV CODE- 250/637: Performed by: NURSE PRACTITIONER

## 2023-03-24 RX ORDER — ESCITALOPRAM OXALATE 10 MG/1
10 TABLET ORAL DAILY
Qty: 30 TABLET | Refills: 0 | Status: SHIPPED | OUTPATIENT
Start: 2023-03-25

## 2023-03-24 RX ADMIN — ACETAMINOPHEN 650 MG: 325 TABLET ORAL at 06:03

## 2023-03-24 RX ADMIN — NICOTINE POLACRILEX 2 MG: 2 LOZENGE ORAL at 11:18

## 2023-03-24 RX ADMIN — ESCITALOPRAM OXALATE 10 MG: 10 TABLET ORAL at 08:27

## 2023-03-24 RX ADMIN — NICOTINE POLACRILEX 2 MG: 2 LOZENGE ORAL at 07:48

## 2023-03-24 NOTE — BH NOTES
Behavioral Health Transition Record to Provider    Patient Name: Kathleen Carson  YOB: 1980  Medical Record Number: [id number]  Date of Admission: 3/16/2023  Date of Discharge: 3/24/2023    Attending Provider: Ian Ortiz MD  Discharging Provider: Mya De Anda NP  To contact this individual call [phone] and ask the  to page. If unavailable, ask to be transferred to [facility] Provider on call. [facility] Provider will be available on call 24/7 and during holidays. Primary Care Provider: None    Allergies   Allergen Reactions    Contrast Agent [Iodine] Nausea and Vomiting       Reason for Admission: CHIEF COMPLAINT: \"I don't want to die, but I don't want to go on living this way. \"     HISTORY OF PRESENTING COMPLAINT:  This is a 40 y.o. female who is currently admitted to the psychiatric floor at [facility] on a voluntary basis for suicidal ideation with plan to hang herself. Psychosocial stressors contributing to this current mental health crisis include recent homelessness, feeling alone and isolated, and having a \"spiritual crisis. \" She states she was looking for places to hang herself. She states she doesn't want to die, but she doesn't want to continue to live like this. She reports she lost \"everything\" in 2020, and the pain has been persistent since then. Pt feels she had a spiritual gift, but she states that after she watched an exorcism with her  in 2019, she was \"forever changed. \" The exorcism was at her kitchen table. Pt appears to be religiously preoccupied; she reports she was arrested outside of [employer] in 2021. She exhibited disorganized thought process and endorses perceptual disturbances to include visions and voices that she perceives as spirits. She does not wish to disclose the content of the perceptual disturbances to treatment team, but rather states she would prefer to disclose that to pastoral care.   She denies current SI/plan/intent, only stating she does not wish to continue living like this. No HI/plan/intent. Admission Diagnosis: Unspecified mood (affective) disorder (HCC) [F39]    * No surgery found *    Results for orders placed or performed during the hospital encounter of 03/16/23   COVID-19 WITH INFLUENZA A/B   Result Value Ref Range    SARS-CoV-2 by PCR Not detected NOTD      Influenza A by PCR Not detected NOTD      Influenza B by PCR Not detected NOTD     CBC WITH AUTOMATED DIFF   Result Value Ref Range    WBC 11.2 (H) 3.6 - 11.0 K/uL    RBC 4.61 3.80 - 5.20 M/uL    HGB 13.6 11.5 - 16.0 g/dL    HCT 42.0 35.0 - 47.0 %    MCV 91.1 80.0 - 99.0 FL    MCH 29.5 26.0 - 34.0 PG    MCHC 32.4 30.0 - 36.5 g/dL    RDW 13.2 11.5 - 14.5 %    PLATELET 477 659 - 910 K/uL    MPV 9.0 8.9 - 12.9 FL    NRBC 0.0 0  WBC    ABSOLUTE NRBC 0.00 0.00 - 0.01 K/uL    NEUTROPHILS 49 32 - 75 %    LYMPHOCYTES 40 12 - 49 %    MONOCYTES 7 5 - 13 %    EOSINOPHILS 3 0 - 7 %    BASOPHILS 1 0 - 1 %    IMMATURE GRANULOCYTES 0 0.0 - 0.5 %    ABS. NEUTROPHILS 5.6 1.8 - 8.0 K/UL    ABS. LYMPHOCYTES 4.5 (H) 0.8 - 3.5 K/UL    ABS. MONOCYTES 0.8 0.0 - 1.0 K/UL    ABS. EOSINOPHILS 0.3 0.0 - 0.4 K/UL    ABS. BASOPHILS 0.1 0.0 - 0.1 K/UL    ABS. IMM.  GRANS. 0.0 0.00 - 0.04 K/UL    DF AUTOMATED     DRUG SCREEN, URINE   Result Value Ref Range    AMPHETAMINES Positive (A) NEG      BARBITURATES Negative NEG      BENZODIAZEPINES Negative NEG      COCAINE Negative NEG      METHADONE Negative NEG      OPIATES Negative NEG      PCP(PHENCYCLIDINE) Negative NEG      THC (TH-CANNABINOL) Negative NEG      Drug screen comment (NOTE)    URINALYSIS W/ REFLEX CULTURE    Specimen: Urine   Result Value Ref Range    Color YELLOW/STRAW      Appearance CLEAR CLEAR      Specific gravity 1.028 1.003 - 1.030      pH (UA) 5.5 5.0 - 8.0      Protein Negative NEG mg/dL    Glucose Negative NEG mg/dL    Ketone 40 (A) NEG mg/dL    Bilirubin Negative NEG      Blood MODERATE (A) NEG Urobilinogen 0.2 0.2 - 1.0 EU/dL    Nitrites Negative NEG      Leukocyte Esterase Negative NEG      WBC 0-4 0 - 4 /hpf    RBC 5-10 0 - 5 /hpf    Epithelial cells MODERATE (A) FEW /lpf    Bacteria Negative NEG /hpf    UA:UC IF INDICATED CULTURE NOT INDICATED BY UA RESULT CNI      Mucus 3+ (A) NEG /lpf   ACETAMINOPHEN   Result Value Ref Range    Acetaminophen level <2 (L) 10 - 30 ug/mL   ETHYL ALCOHOL   Result Value Ref Range    ALCOHOL(ETHYL),SERUM <25 <22 MG/DL   SALICYLATE   Result Value Ref Range    Salicylate level 2.8 2.8 - 79.6 MG/DL   METABOLIC PANEL, COMPREHENSIVE   Result Value Ref Range    Sodium 136 136 - 145 mmol/L    Potassium 3.3 (L) 3.5 - 5.1 mmol/L    Chloride 107 97 - 108 mmol/L    CO2 24 21 - 32 mmol/L    Anion gap 5 5 - 15 mmol/L    Glucose 119 (H) 65 - 100 mg/dL    BUN 15 6 - 20 MG/DL    Creatinine 0.70 0.55 - 1.02 MG/DL    BUN/Creatinine ratio 21 (H) 12 - 20      eGFR >60 >60 ml/min/1.73m2    Calcium 9.2 8.5 - 10.1 MG/DL    Bilirubin, total 0.5 0.2 - 1.0 MG/DL    ALT (SGPT) 23 12 - 78 U/L    AST (SGOT) 14 (L) 15 - 37 U/L    Alk.  phosphatase 34 (L) 45 - 117 U/L    Protein, total 7.1 6.4 - 8.2 g/dL    Albumin 3.6 3.5 - 5.0 g/dL    Globulin 3.5 2.0 - 4.0 g/dL    A-G Ratio 1.0 (L) 1.1 - 2.2     TSH 3RD GENERATION   Result Value Ref Range    TSH 1.75 0.36 - 3.74 uIU/mL   SARS-COV-2   Result Value Ref Range    SARS-CoV-2 by PCR Please find results under separate order     SARS-COV-2   Result Value Ref Range    Specimen source Nasopharyngeal      SARS-CoV-2 Not detected NOTD     METABOLIC PANEL, COMPREHENSIVE   Result Value Ref Range    Sodium 136 136 - 145 mmol/L    Potassium 4.1 3.5 - 5.1 mmol/L    Chloride 109 (H) 97 - 108 mmol/L    CO2 22 21 - 32 mmol/L    Anion gap 5 5 - 15 mmol/L    Glucose 99 65 - 100 mg/dL    BUN 11 6 - 20 MG/DL    Creatinine 0.52 (L) 0.55 - 1.02 MG/DL    BUN/Creatinine ratio 21 (H) 12 - 20      eGFR >60 >60 ml/min/1.73m2    Calcium 9.0 8.5 - 10.1 MG/DL    Bilirubin, total 0.2 0.2 - 1.0 MG/DL    ALT (SGPT) 26 12 - 78 U/L    AST (SGOT) 13 (L) 15 - 37 U/L    Alk. phosphatase 33 (L) 45 - 117 U/L    Protein, total 6.6 6.4 - 8.2 g/dL    Albumin 3.5 3.5 - 5.0 g/dL    Globulin 3.1 2.0 - 4.0 g/dL    A-G Ratio 1.1 1.1 - 2.2     HCG URINE, QL. - POC   Result Value Ref Range    Pregnancy test,urine (POC) Negative NEG     EKG, 12 LEAD, INITIAL   Result Value Ref Range    Ventricular Rate 76 BPM    Atrial Rate 76 BPM    P-R Interval 104 ms    QRS Duration 78 ms    Q-T Interval 378 ms    QTC Calculation (Bezet) 425 ms    Calculated R Axis 35 degrees    Calculated T Axis 16 degrees    Diagnosis       Sinus rhythm with short PA  Otherwise normal ECG  No previous ECGs available  Confirmed by Tamela Avalos ([id number]) on 3/17/2023 4:25:08 PM         Immunizations administered during this encounter: There is no immunization history for the selected administration types on file for this patient. Screening for Metabolic Disorders for Patients on Antipsychotic Medications  (Data obtained from the EMR)    Estimated Body Mass Index  Estimated body mass index is 31.84 kg/m² as calculated from the following:    Height as of this encounter: 5' 4.5\" (1.638 m). Weight as of this encounter: 85.5 kg (188 lb 6.4 oz).      Vital Signs/Blood Pressure  Visit Vitals  BP (!) 127/90 (BP 1 Location: Left upper arm, BP Patient Position: Sitting)   Pulse 89   Temp 97.3 °F (36.3 °C)   Resp 16   Ht 5' 4.5\" (1.638 m)   Wt 85.5 kg (188 lb 6.4 oz)   SpO2 97%   Breastfeeding No   BMI 31.84 kg/m²       Blood Glucose/Hemoglobin A1c  Lab Results   Component Value Date/Time    Glucose 99 03/21/2023 05:34 AM       No results found for: HBA1C, [id number]     Lipid Panel  No results found for: CHOL, CHOLX, CHLST, CHOLV, 284190, HDL, HDLP, LDL, LDLC, DLDLP, TGLX, TRIGL, TRIGP, CHHD, CHHDX     Discharge Diagnosis: Unspecified psychosis vs MDD with psychotic features; r/o stimulant use induced psychosis; PTSD, r/o schizotypal personality disorder    Discharge Plan: The patient Rossy Paez exhibits the ability to control behavior in a less restrictive environment. Patient's level of functioning is improving. No assaultive/destructive behavior has been observed for the past 24 hours. No suicidal/homicidal threat or behavior has been observed for the past 24 hours. There is no evidence of serious medication side effects. Patient has not been in physical or protective restraints for at least the past 24 hours. If weapons involved, how are they secured? None    Is patient aware of and in agreement with discharge plan? Yes    Arrangements for medication:  Prescriptions given to patient, given a 30 day supply filled through [address] [place] [address] of discharge instructions to provider?:  Yes    Arrangements for transportation home:  [facility]U    Keep all follow up appointments as scheduled, continue to take prescribed medications per physician instructions. Mental health crisis number:  331 or your local mental health crisis line number at [address] [place] [address] at [phone]    Discharge Medication List and Instructions:   Current Discharge Medication List        START taking these medications    Details   escitalopram oxalate (LEXAPRO) 10 mg tablet Take 1 Tablet by mouth daily. Indications: anxiousness associated with depression, posttraumatic stress syndrome  Qty: 30 Tablet, Refills: 0  Start date: 3/25/2023           CONTINUE these medications which have NOT CHANGED    Details   medroxyPROGESTERone (Depo-Provera) 150 mg/mL syrg 150 mg by IntraMUSCular route once.            STOP taking these medications       busPIRone (BUSPAR) 7.5 mg tablet Comments:   Reason for Stopping:         dextroamphetamine-amphetamine (ADDERALL) 30 mg tablet Comments:   Reason for Stopping:               Unresulted Labs (24h ago, onward)      None          To obtain results of studies pending at discharge, please contact [phone]    Follow-up Information       Follow up With Specialties Details Why [id number] Scar Mccall NP  Go on 3/30/2023 APPOINTMENT @ 1:30PM [address] [place], [address] [place]    PHONE:  [phone]: [phone] CSB  Go to Go to for mental health case management and therapy services. Walk-in intake hours are Monday-Friday: 8 a.m. - 4:30 p.m. Please bring a copy of your ID, insurance card, proof of income and hospital discharge paperwork for your intake. [address] [facility] [place]    Phone : [phone]  Fax : [phone]    KennaCarleen  Go to [employer] dedicated staff provide street outreach and case-management for those experiencing homelessness. The [employer] is a safe and welcoming place to stay during the day. [address]    Phone: [phone]  Call Contact for homelessness assistance resources [address] [place] [address] [address],  [place], [address] [place] [address] [address]kw[address]    Phone: [phone]  The [address] [place] [address] Place: [phone]    [facility] Crisis [facility]abil[facility]  Go today Go to today for crisis stabilization services Phone: [phone]   Fax: [phone]    [employer]  Go to As needed for primary care services, dental, mental health services, and homelessness case management services. [address] [place] [address] [phone]            Advanced Directive:   Does the patient have an appointed surrogate decision maker? No  Does the patient have a Medical Advance Directive? No  Does the patient have a Psychiatric Advance Directive? No  If the patient does not have a surrogate or Medical Advance Directive AND Psychiatric Advance Directive, the patient was offered information on these advance directives Yes and Patient declined to complete    Patient Instructions: Please continue all medications until otherwise directed by physician. Tobacco Cessation Discharge Plan:   Is the patient a smoker and needs referral for smoking cessation? No  Patient referred to the following for smoking cessation with an appointment? Not applicable     Patient was offered medication to assist with smoking cessation at discharge? Not applicable  Was education for smoking cessation added to the discharge instructions? Yes    Alcohol/Substance Abuse Discharge Plan:   Does the patient have a history of substance/alcohol abuse and requires a referral for treatment? No  Patient referred to the following for substance/alcohol abuse treatment with an appointment? Not applicable  Patient was offered medication to assist with alcohol cessation at discharge? Not applicable  Was education for substance/alcohol abuse added to discharge instructions? Not applicable    Patient discharged to Home; discussed with patient/caregiver and provided to the patient/caregiver either in hard copy or electronically.

## 2023-03-24 NOTE — DISCHARGE INSTRUCTIONS
DISCHARGE SUMMARY    NAME:Debbie Barrera  : 1980  MRN: [id number]    The patient Maria Carroll exhibits the ability to control behavior in a less restrictive environment. Patient's level of functioning is improving. No assaultive/destructive behavior has been observed for the past 24 hours. No suicidal/homicidal threat or behavior has been observed for the past 24 hours. There is no evidence of serious medication side effects. Patient has not been in physical or protective restraints for at least the past 24 hours. If weapons involved, how are they secured? None    Is patient aware of and in agreement with discharge plan? Yes    Arrangements for medication:  Prescriptions given to patient, given a 30 day supply filled through [address] [place] [address] of discharge instructions to provider?:  Yes    Arrangements for transportation home:  CSU    Keep all follow up appointments as scheduled, continue to take prescribed medications per physician instructions. Mental health crisis number:  527 or your local mental health crisis line number at [address] [facility] at [address] [place] [address] Emergency WARM LINE      [phone] ()      M-F: 9am to 9pm      Sat & Sun: [facility] suicide prevention lines:                             7-234-BXHSVYZ ([phone])       [phone] ([phone])    Crisis Text Line:  Text HOME to [place] from Nurse    PATIENT INSTRUCTIONS:    What to do at Home:  Recommended activity: Activity as tolerated. If you experience any of the following symptoms feeling hopeless, helpless, overwhelming thoughts of harming self or others, please call 911 or your local mental health crisis number. *  Please give a list of your current medications to your Primary Care Provider.     *  Please update this list whenever your medications are discontinued, doses are      changed, or new medications (including over-the-counter products) are added. *  Please carry medication information at all times in case of emergency situations. These are general instructions for a healthy lifestyle:    No smoking/ No tobacco products/ Avoid exposure to second hand smoke  Surgeon General's Warning:  Quitting smoking now greatly reduces serious risk to your health. Obesity, smoking, and sedentary lifestyle greatly increases your risk for illness    A healthy diet, regular physical exercise & weight monitoring are important for maintaining a healthy lifestyle    You may be retaining fluid if you have a history of heart failure or if you experience any of the following symptoms:  Weight gain of 3 pounds or more overnight or 5 pounds in a week, increased swelling in our hands or feet or shortness of breath while lying flat in bed. Please call your doctor as soon as you notice any of these symptoms; do not wait until your next office visit. The discharge information has been reviewed with the patient. The patient verbalized understanding. Discharge medications reviewed with the patient and appropriate educational materials and side effects teaching were provided.   ___________________________________________________________________________________________________________________________________

## 2023-03-24 NOTE — BH NOTES
Chief Complaint:  \"I'm just trying to cope. \"    Length of Stay: 8 Days    Interval History:  3/24/23- Zoie Vuong is doing well. She states that while there is still much uncertainty about her future, as she will discharge to a CSU, she has been coping well and feels that she is not falling apart under the anxiety of it. She has tolerated the discontinuation of the Buspar and the increase in Lexapro. Sleep was not as good last night due to anxiety about discharge, but overall she still feels she is able to sleep without any medication going forward. She denies any thoughts of harming self or others, no AVH. She states she is \"glad to be unchained from the Buspar,\" and she feels good about being back on Lexapro. 3/23/23[facility] states she slept well last night, and states she slept without any medications, states it was a natural sleep. She reports she slept about 8 hours in total. She states that she is feeling antsy to get of the hospital, because she doesn't feel she has a purpose here. Her short term goal is finding a place to live, and she is not sure at this time what her more long term goals are; she states she is not allowing herself to think about her long term goals because right now, she is just focused on the short term goal. She denies SI/HI/AVH. She is tolerating her Lexapro well and the discontinuation of the Buspar well.     3/22/23[facility] met with the  today, which was very helpful for for her, and she is interested in pursuing spiritual counseling when she discharges. However, Zoie Vuong is struggling with the other patients and is having a hard time with her roommate. She had a hard time sleeping last night, and stated she needed her trazodone and her Atarax last night, though this was related to her roommate. 3/21/23[facility] states she is feeling better this morning.  She did not take her Atarax yesterday night, and she feels better this morning, less groggy, and was able to shower and eat breakfast earlier than yesterday. She attributes the groggy feeling she's been having recently to the Atarax, and feels better not taking it. Tyler Mark continues to express frustration when medications are suggested, as she believes that her issues are spiritual, and does not feel her Mu-ism concerns are adequately addressed here, and she is not inclined to take medication for this. She states that she feels ADHD, and her Mu-ism trauma secondary to witnessing an \"exorcism,\" are her primary concerns. No SI/plan/intent, no HI/plan/intent. 3/20/23Patriciaann Choi states she is feeling somewhat better. She feels being back on her Buspar has been helpful. Pt continues to exhibit lability, however, and states she is still feeling lonely a nd states she still feels like she doesn't have anyone \"rooting for me. \" She appears to exhibit thought blocking and falls into an irregular speech pattern at times, closing her eyes and speaking in a very high pitched voice at times. She denies SI, but exhibits disorganization and hopelessness. She is resistant to the idea of taking her Risperdal and resistant to the idea that she has a mental illness, and continues to revisit the time she was arrested outside of a [employer] for trespassing and putting \"the blood of [place]\" on herself. She continues to state that the perceptual disturbances she demonstrates are related to a spiritual gift. 3/19/23- Tyler Mark reports that she has been sleeping a lot today. Denies medication side effects. +AH/VH - \"I don't want to get into it. \" She has been isolative to her room. No acute overnight events are reported. She has been compliant with medications. Past Medical History:  No past medical history on file.       Labs:  Lab Results   Component Value Date/Time    WBC 11.2 (H) 03/16/2023 08:26 PM    HGB 13.6 03/16/2023 08:26 PM    HCT 42.0 03/16/2023 08:26 PM    PLATELET 597 97/61/7905 08:26 PM    MCV 91.1 03/16/2023 08:26 PM      Lab Results Component Value Date/Time    Sodium 136 03/21/2023 05:34 AM    Potassium 4.1 03/21/2023 05:34 AM    Chloride 109 (H) 03/21/2023 05:34 AM    CO2 22 03/21/2023 05:34 AM    Anion gap 5 03/21/2023 05:34 AM    Glucose 99 03/21/2023 05:34 AM    BUN 11 03/21/2023 05:34 AM    Creatinine 0.52 (L) 03/21/2023 05:34 AM    BUN/Creatinine ratio 21 (H) 03/21/2023 05:34 AM    GFR est AA >60 12/21/2020 11:26 PM    GFR est non-AA >60 12/21/2020 11:26 PM    Calcium 9.0 03/21/2023 05:34 AM    Bilirubin, total 0.2 03/21/2023 05:34 AM    Alk.  phosphatase 33 (L) 03/21/2023 05:34 AM    Protein, total 6.6 03/21/2023 05:34 AM    Albumin 3.5 03/21/2023 05:34 AM    Globulin 3.1 03/21/2023 05:34 AM    A-G Ratio 1.1 03/21/2023 05:34 AM    ALT (SGPT) 26 03/21/2023 05:34 AM      Vitals:    03/22/23 1539 03/23/23 0803 03/23/23 1555 03/24/23 0749   BP: 119/76 123/86 138/86 (!) 127/90   Pulse: 86 76 90 89   Resp: 16 15 16 16   Temp: 98.2 °F (36.8 °C) 99.2 °F (37.3 °C) 99 °F (37.2 °C) 97.3 °F (36.3 °C)   SpO2: 97% 95% 95% 97%   Weight:       Height:             Current Facility-Administered Medications   Medication Dose Route Frequency Provider Last Rate Last Admin    escitalopram oxalate (LEXAPRO) tablet 10 mg  10 mg Oral DAILY Yasmani Chan NP   10 mg at 03/24/23 0827    OLANZapine (ZyPREXA) tablet 5 mg  5 mg Oral Q6H PRN Kortney No NP        haloperidol lactate (HALDOL) injection 5 mg  5 mg IntraMUSCular Q6H PRN Oneal, Kortney A, NP        benztropine (COGENTIN) tablet 1 mg  1 mg Oral BID PRN Kortney No NP        diphenhydrAMINE (BENADRYL) injection 50 mg  50 mg IntraMUSCular BID PRN Kortney No NP        hydrOXYzine HCL (ATARAX) tablet 50 mg  50 mg Oral TID PRN Kortney No NP   50 mg at 03/22/23 0204    LORazepam (ATIVAN) 2 mg/mL injection 1 mg  1 mg IntraMUSCular Q4H PRN Kortney No NP        traZODone (DESYREL) tablet 50 mg  50 mg Oral QHS PRN Kortney No NP   50 mg at 03/21/23 2310    acetaminophen (TYLENOL) tablet 650 mg  650 mg Oral Q4H PRN Kortney No NP   650 mg at 03/24/23 0603    magnesium hydroxide (MILK OF MAGNESIA) 400 mg/5 mL oral suspension 30 mL  30 mL Oral DAILY PRN Kortney No NP        nicotine buccal (POLACRILEX) lozenge 2 mg  2 mg Oral Q2H PRN Ajith Jones NP   2 mg at 03/24/23 0748     Mental Status Exam:  Eye contact: fair  Grooming: fair  Psychomotor activity: wnl  Speech is spontaneous  Mood is \"OK \"  Affect: guarded  Perception: +religiously preoccupied  Suicidal ideation: no SI/plan/intent, no HI/plan/intent  Cognition is grossly intact    Physical Exam:  Body habitus: Body mass index is 31.84 kg/m². Musculoskeletal system: normal gait  Tremor - neg  Cog wheeling - neg    Assessment and Plan:  Srinivas Borges Holly meets criteria for a diagnosis of Unspecified psychosis vs MDD with psychotic features; r/o stimulant use induced psychosis; PTSD, r/o schizotypal personality disorder    3/24/23- Discharge today to [facility]. Follow up with [facility] psychiatry 3/30/23.      3/23/23- Increasing Lexapro to 10mg, discharge to CSU tomorrow    3/22/23- Continue medications, last Buspar does this evening. Discharge Friday to CSU.     3/21/23- K+ improved at 4.1 this morning. Discontinuing Risperdal as pt has not been taking. Starting Lexapro 5mg daily as pt is not willing to take an antipsychotic, though this remains my recommendation due to continued disorganization of thought and Temple preoccupation. Tapering off Buspar, 5mg BID x 3 doses, then discontinue. 3/20/23- Repeat CMP tomorrow to follow up on K+ of 3.3 on 3/16, will replete if needed. Recommended increasing Risperdal to 1mg BID, but will keep it at 1mg QHS for now due to pt reluctance. A coordinated, multidisplinary treatment team round was conducted with the patient, nurses, pharmcist,  and writer present.  Discussions held with , and/or with family members; Complete current electronic health record for patient was reviewed in full including consultant notes, ancillary staff notes, nurses and tech notes, labs and vitals. I certify that this patients inpatient psychiatric hospital services furnished since the previous certification were, and continue to be, required for treatment that could reasonably be expected to improve the patient's condition, or for diagnostic study, and that the patient continues to need, on a daily basis, active treatment furnished directly by or requiring the supervision of inpatient psychiatric facility personnel. In addition, the hospital records show that services furnished were intensive treatment services, admission or related services, or equivalent services.

## 2023-03-24 NOTE — PROGRESS NOTES
Pharmacist Discharge Medication Reconciliation    Discharging Provider: Margaux Dee NP    Significant PMH: No past medical history on file. Chief Complaint for this Admission:   Chief Complaint   Patient presents with    Mental Health Problem    Suicidal     Allergies: Contrast agent [iodine]    Discharge Medications:   Current Discharge Medication List        START taking these medications    Details   escitalopram oxalate (LEXAPRO) 10 mg tablet Take 1 Tablet by mouth daily. Indications: anxiousness associated with depression, posttraumatic stress syndrome  Qty: 30 Tablet, Refills: 0  Start date: 3/25/2023           CONTINUE these medications which have NOT CHANGED    Details   medroxyPROGESTERone (Depo-Provera) 150 mg/mL syrg 150 mg by IntraMUSCular route once.            STOP taking these medications       busPIRone (BUSPAR) 7.5 mg tablet Comments:   Reason for Stopping:         dextroamphetamine-amphetamine (ADDERALL) 30 mg tablet Comments:   Reason for Stopping:             The patient's chart, MAR and AVS were reviewed by SHAYLEE EllerD.

## 2023-03-24 NOTE — PROGRESS NOTES
Problem: Depressed Mood (Adult/Pediatric)  Goal: *STG: Participates in treatment plan  Outcome: Progressing Towards Goal  Goal: *STG: Verbalizes anger, guilt, and other feelings in a constructive manor  Outcome: Progressing Towards Goal  Goal: *STG: Attends activities and groups  Outcome: Progressing Towards Goal    Patient is cooperative, interacts with peers, medication compliant. Denies si/hi/a/v/h.  Mood is euthymic

## 2023-03-24 NOTE — PROGRESS NOTES
Problem: Falls - Risk of  Goal: *Absence of Falls  Description: Document Yogesh Gonzales Fall Risk and appropriate interventions in the flowsheet.   Outcome: Progressing Towards Goal  Note: Fall Risk Interventions:            Medication Interventions: Teach patient to arise slowly     Will continue q 15 minute safety checks

## 2023-03-24 NOTE — DISCHARGE SUMMARY
DISCHARGE SUMMARY    Some parts of the discharge summary are from the initial Psychiatric interview that was done on admission by the admitting psychiatrist.     Date of Admission: 3/16/2023    Date of Discharge: 3/24/2023     TYPE OF DISCHARGE:   REGULAR     INITIAL PSYCHIATRIC INTERVIEW:  CHIEF COMPLAINT: \"I don't want to die, but I don't want to go on living this way. \"     HISTORY OF PRESENTING COMPLAINT:  This is a 40 y.o. female who is currently admitted to the psychiatric floor at [facility] on a voluntary basis for suicidal ideation with plan to hang herself. Psychosocial stressors contributing to this current mental health crisis include recent homelessness, feeling alone and isolated, and having a \"spiritual crisis. \" She states she was looking for places to hang herself. She states she doesn't want to die, but she doesn't want to continue to live like this. She reports she lost \"everything\" in 2020, and the pain has been persistent since then. Pt feels she had a spiritual gift, but she states that after she watched an exorcism with her  in 2019, she was \"forever changed. \" The exorcism was at her kitchen table. Pt appears to be religiously preoccupied; she reports she was arrested outside of [employer] in 2021. She exhibited disorganized thought process and endorses perceptual disturbances to include visions and voices that she perceives as spirits. She does not wish to disclose the content of the perceptual disturbances to treatment team, but rather states she would prefer to disclose that to pastoral care. She denies current SI/plan/intent, only stating she does not wish to continue living like this. No HI/plan/intent. PAST PSYCHIATRIC HISTORY: Pt has a hx of depression and PTSD. She has a hx of multiple past psychiatric admissions, most recent psychiatric admission was at [facility] in May 2022. Pt sees Raghavendra Castelan NP at [facility].  She has been taking 30mg Adderall daily and Buspar 7.5mg BID. She does not have a therapist. She reports one serious suicide attempt in 2009 after being told she had early onset Parkinson's. She believes she has ADHD and states \"ADHD testing at 46 revolutionized my whole life. \" She has been on stimulants for several years, and prior to that she has been on several antidepressants. SUBSTANCE ABUSE HISTORY: Pt uses THC. Denies etoh or other substances. Denies THC use. PSYCHOSOCIAL HISTORY: Pt is currently homeless. She is . She does not have children. Legal hx includes being arrested for trespassing on [employer] [place] in 2021 and \"covering myself in the blood of [place].\" She does not currently work. PAST MEDICAL HISTORY: Reviewed per H&P. ALLERGIES: contrast agent (iodine)     MENTAL STATUS EXAM: The patient is a 40 y.o.  female who is in moderate grooming, dressed in a hospital gown. The patient's affect was constricted and guarded and incongruent. Eye contact was poor. Mood is dysphoric. Thought process is disorganized and perseverative. Thought blocking was observed. Thought content is significant for Alevism preoccupation. Pt endorsed passive thoughts of suicide. Pt denied thoughts of harming others. There was no agitation or lability noted. Speech was spontaneous but abnormal in rhythm and tone was high pitched and quiet, and sobbing at times. Attention and concentration are poor. Judgment and insight are noted to be poor. COURSE IN THE HOSPITAL:  Samaria Thibodeaux was admitted to the inpatient psychiatry unit at [facility] for acute psychiatric stabilization in regards to symptomatology as described in the HPI above and placed on Q15 minute checks and precautions. While on the unit, Samaria Thibodeaux was involved in individual, group, occupational and milieu therapy. Pt was started back on psychiatric medications to address symptomatology described above.  Pt improved gradually and was able to integrate into the milieu with help from the nursing staff. Jessica Lockwood has made progress over the course of hospitalization and is psychiatrically stable for discharge at this time. Pt was appropriate on the unit, interacted and engaged appropriately with peers and staff, and was cooperative with medications and participated in the unit routine. Please see individual progress notes for more specific details regarding patient's hospitalization course. Patient was discharged as per the plan. Pt  had been doing well on the unit as per the report of the nursing staff and my observations. No PRN medication for agitation, seclusion or restraints were required during the last 48 hours of the stay. Jessica Lockwood has improved progressively to the point of being stable for discharge and outpatient FU. At this time pt did not offer any complaints. Patient denied any SI or HI. Denied any AH or VH. Pt engaged in safety planning and agrees to enact safety plan should any thoughts of harming self or others arise. Patient is future oriented, identifies reasons for living and goals for the future, and is not felt to be an immediate risk to self or others. Patient will follow-up with appointments and remains motivated to be in treatment. The patient verbalized understanding of the above discharge instructions. DISCHARGE DISPOSITION: Discharge to CSU. DISCHARGE DIAGNOSIS: Unspecified psychosis vs MDD with psychotic features; r/o stimulant use induced psychosis; PTSD, r/o schizotypal personality disorder    Current Discharge Medication List        START taking these medications    Details   escitalopram oxalate (LEXAPRO) 10 mg tablet Take 1 Tablet by mouth daily.  Indications: anxiousness associated with depression, posttraumatic stress syndrome  Qty: 30 Tablet, Refills: 0  Start date: 3/25/2023           CONTINUE these medications which have NOT CHANGED    Details   medroxyPROGESTERone (Depo-Provera) 150 mg/mL syrg 150 mg by IntraMUSCular route once. STOP taking these medications       busPIRone (BUSPAR) 7.5 mg tablet Comments:   Reason for Stopping:         dextroamphetamine-amphetamine (ADDERALL) 30 mg tablet Comments:   Reason for Stopping:              No results found for: VALF2, VALAC, VALP, VALPR, DS6, CRBAM, CRBAMP, CARB2, XCRBAM  No results found for: LITHM    Follow-up Information       Follow up With Specialties Details Why Contact Ariana Morrison NP  Go on 3/30/2023 APPOINTMENT @ 1:30PM [address] [place] [address]. [place], [address] [facility]    PHONE:  [phone]:  [phone] CSB  Go to Go to for mental health case management and therapy services. Walk-in intake hours are Monday-Friday: 8 a.m. - 4:30 p.m. Please bring a copy of your ID, insurance card, proof of income and hospital discharge paperwork for your intake. Khadar [address] [place] [address]    Phone : [phone]  Fax : [phone]    Mayi  Go to [facility] dedicated staff provide street outreach and case-management for those experiencing homelessness. The [facility] within [facility] is a safe and welcoming place to stay during the day. [address]    Phone: [phone]  Call Contact for homelessness assistance resources [address] [place] [address] 110,  [place], [address] [place] [address] [address]ky    Phone: [phone]  The [address] [place] [address] Place: [phone]    None    None (395) Patient stated that they have no PCP            WOUND CARE: none needed. MENTAL STATUS EXAM:  Eye contact: fair  Grooming: fair  Psychomotor activity: wnl  Speech is spontaneous  Mood is \"not super high or low\"  Affect: guarded  Perception: +religiously preoccupied  Suicidal ideation: no SI/plan/intent, no HI/plan/intent  Cognition is grossly intact    PROGNOSIS:   Good / Fair based on nature of patient's pathology/ies and treatment compliance issues. Prognosis is greatly dependent upon patient's ability to  follow up on psychiatric/psychotherapy appointments as well as to comply with psychiatric medications as prescribed.

## 2023-03-24 NOTE — INTERDISCIPLINARY ROUNDS
Behavioral Health Interdisciplinary Rounds     Patient Name: Kathleen Carson  Age: 40 y.o. Room/Bed:  728/  Primary Diagnosis: <principal problem not specified>   Admission Status: Voluntary     Readmission within 30 days: no  Power of  in place: no  Patient requires a blocked bed: yes          Reason for blocked bed: Behavior  Sleep hours:  6      Participation in Care/Groups:  yes  Medication Compliant?: Yes  PRNS (last 24 hours): Pain    Restraints (last 24 hours):  no  __________________________________________________  OQ Admission Analysis Survey completed:  OQ Admission Analysis Survey score:  OQ Discharge Analysis Survey completed:  OQ Discharge Analysis Survey score:   __________________________________________________     Alcohol screening (AUDIT) completed -  AUDIT Score: 0  If applicable, date SBIRT discussed in treatment team AND documented:    Tobacco - patient is a smoker: Have You Used Tobacco in the Past 30 Days: Yes  Illegal Drugs use: Have You Used Any Illegal Substances Over the Past 12 Months: No    24 hour chart check complete: yes     _______________________________________________    Patient goal(s) for today:   Treatment team focus/goals:   Progress note: Pt met with treatment team and appeared with a calm and pleasant mood and affect. Patient denies SI/HI and [facility], denies side effects from medications and feels happy that she is no longer taking Buspar. Patient feels anxious about feeling in a transitional period but is looking forward to going to the community stabilization program. Patient has appointment with [address] [place] [address] scheduled at the end of the month. Plan to discharge patient this afternoon with 30 days worth of medication filled through [address] to .      Spiritual Care Consult:   Financial concerns/prescription coverage:    Family contact:                        Family requesting physician contact today:    Discharge plan:   Access to weapons :                                                              Outpatient provider(s):   Patient's preferred phone number for follow up call :   Patient's preferred e-mail address :    LOS:  8  Expected LOS: 8    Participating treatment team members: Monica Johnson MSW, Tereso Madrigal, RN, William Frazier, KARL, Araceli PaganD

## 2023-05-11 RX ORDER — ESCITALOPRAM OXALATE 10 MG/1
TABLET ORAL DAILY
COMMUNITY
Start: 2023-03-25

## 2023-05-11 RX ORDER — MEDROXYPROGESTERONE ACETATE 150 MG/ML
INJECTION, SUSPENSION INTRAMUSCULAR ONCE
COMMUNITY

## 2024-11-14 NOTE — PROGRESS NOTES
Admission Medication Reconciliation:    Information obtained from:  patient interview, Insurance claims data, and [facility]  RxQuery data available¹:  YES    Comments/Recommendations: Updated PTA meds/reviewed patient's allergies. 1)  She confirms her PTA medications during treatment team. She finds generic Adderall helpful for her ADHD and anxiety. She finds buspirone sedating and is interested in an alternative for anxiety. She is concerned about \"detoxing\" off the buspirone - she was reassured that she is on a low dose and buspirone is not associated with withdrawal.     2)  The [facility] Monitoring Program () was assessed to determine fill history of any controlled medications. The patient has filled the following controlled medications in the last  6 months. - 2/9/23: D-amphetamine 30 mg, #60 for 60 day supply  - 11/28/22: D-amphetamine 10 mg, #180 for 90 day supply  - 10/8/22: D-amphetamine 10 mg, #180 for 90 day supply    3)  Medication changes (since last review): Adjusted  - D-amphetamine 30 mg - 15 mg (1/2 tablet) BID    Removed  - bupropion, buspirone 10 mg, D-amphetamine 20 mg, escitalopram, medroxyprogesterone, trazodone   ¹RxQuery pharmacy benefit data reflects medications filled and processed through the patient's insurance, however this data does NOT capture whether the medication was picked up or is currently being taken by the patient. Allergies:  Contrast agent [iodine]    Significant PMH/Disease States: No past medical history on file. Chief Complaint for this Admission:    Chief Complaint   Patient presents with    Mental Health Problem    Suicidal     Prior to Admission Medications:   Prior to Admission Medications   Prescriptions Last Dose Informant Taking?   busPIRone (BUSPAR) 7.5 mg tablet   Yes   Sig: Take 7.5 mg by mouth two (2) times a day. dextroamphetamine-amphetamine (ADDERALL) 30 mg tablet   Yes   Sig: Take 15 mg by mouth two (2) times a day. How Severe Is Your Acne?: mild Is This A New Presentation, Or A Follow-Up?: Acne medroxyPROGESTERone (Depo-Provera) 150 mg/mL syrg   Yes   Si mg by IntraMUSCular route once.       Facility-Administered Medications: None     Rogelio العلي, PHARMD What Type Of Note Output Would You Prefer (Optional)?: Standard Output Additional Comments (Use Complete Sentences): Here for acne. Yes- Second parent accepts free interpretation services

## 2024-12-15 ENCOUNTER — HOSPITAL ENCOUNTER (EMERGENCY)
Facility: HOSPITAL | Age: 44
Discharge: HOME | End: 2024-12-15
Attending: STUDENT IN AN ORGANIZED HEALTH CARE EDUCATION/TRAINING PROGRAM
Payer: COMMERCIAL

## 2024-12-15 ENCOUNTER — APPOINTMENT (OUTPATIENT)
Facility: HOSPITAL | Age: 44
End: 2024-12-15
Payer: COMMERCIAL

## 2024-12-15 VITALS
WEIGHT: 179.01 LBS | HEIGHT: 64 IN | TEMPERATURE: 97.9 F | HEART RATE: 96 BPM | SYSTOLIC BLOOD PRESSURE: 133 MMHG | DIASTOLIC BLOOD PRESSURE: 89 MMHG | BODY MASS INDEX: 30.56 KG/M2 | RESPIRATION RATE: 18 BRPM | OXYGEN SATURATION: 99 %

## 2024-12-15 DIAGNOSIS — B02.9 HERPES ZOSTER WITHOUT COMPLICATION: ICD-10-CM
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
## 2024-12-15 PROCEDURE — 73130 X-RAY EXAM OF HAND: CPT

## 2024-12-15 PROCEDURE — 6370000000 HC RX 637 (ALT 250 FOR IP): Performed by: EMERGENCY MEDICINE

## 2024-12-15 PROCEDURE — 99284 EMERGENCY DEPT VISIT MOD MDM: CPT

## 2024-12-15 PROCEDURE — 90471 IMMUNIZATION ADMIN: CPT | Performed by: EMERGENCY MEDICINE

## 2024-12-15 PROCEDURE — 90714 TD VACC NO PRESV 7 YRS+ IM: CPT | Performed by: EMERGENCY MEDICINE

## 2024-12-15 PROCEDURE — 6360000002 HC RX W HCPCS: Performed by: EMERGENCY MEDICINE

## 2024-12-15 PROCEDURE — 73110 X-RAY EXAM OF WRIST: CPT

## 2024-12-15 RX ORDER — VALACYCLOVIR HYDROCHLORIDE 500 MG/1
1000 TABLET, FILM COATED ORAL
Status: COMPLETED | OUTPATIENT
Start: 2024-12-15 | End: 2024-12-15

## 2024-12-15 RX ORDER — GABAPENTIN 600 MG/1
300 TABLET ORAL
Status: COMPLETED | OUTPATIENT
Start: 2024-12-15 | End: 2024-12-15

## 2024-12-15 RX ORDER — VALACYCLOVIR HYDROCHLORIDE 1 G/1
1000 TABLET, FILM COATED ORAL 3 TIMES DAILY
Qty: 21 TABLET | Refills: 0 | Status: SHIPPED | OUTPATIENT
Start: 2024-12-15 | End: 2024-12-22

## 2024-12-15 RX ORDER — KETOROLAC TROMETHAMINE 10 MG/1
10 TABLET, FILM COATED ORAL 3 TIMES DAILY PRN
Qty: 15 TABLET | Refills: 0 | Status: SHIPPED | OUTPATIENT
Start: 2024-12-15

## 2024-12-15 RX ADMIN — VALACYCLOVIR HYDROCHLORIDE 1000 MG: 500 TABLET, FILM COATED ORAL at 10:36

## 2024-12-15 RX ADMIN — GABAPENTIN 300 MG: 600 TABLET, FILM COATED ORAL at 10:36

## 2024-12-15 RX ADMIN — CLOSTRIDIUM TETANI TOXOID ANTIGEN (FORMALDEHYDE INACTIVATED) AND CORYNEBACTERIUM DIPHTHERIAE TOXOID ANTIGEN (FORMALDEHYDE INACTIVATED) 0.5 ML: 5; 2 INJECTION, SUSPENSION INTRAMUSCULAR at 10:37

## 2024-12-15 ASSESSMENT — PAIN DESCRIPTION - DESCRIPTORS: DESCRIPTORS: SHARP

## 2024-12-15 ASSESSMENT — PAIN DESCRIPTION - ORIENTATION: ORIENTATION: LEFT

## 2024-12-15 ASSESSMENT — PAIN DESCRIPTION - FREQUENCY: FREQUENCY: CONTINUOUS

## 2024-12-15 ASSESSMENT — PAIN - FUNCTIONAL ASSESSMENT
PAIN_FUNCTIONAL_ASSESSMENT: PREVENTS OR INTERFERES SOME ACTIVE ACTIVITIES AND ADLS
PAIN_FUNCTIONAL_ASSESSMENT: 0-10

## 2024-12-15 ASSESSMENT — PAIN SCALES - GENERAL: PAINLEVEL_OUTOF10: 9

## 2024-12-15 ASSESSMENT — PAIN DESCRIPTION - PAIN TYPE: TYPE: ACUTE PAIN

## 2024-12-15 ASSESSMENT — PAIN DESCRIPTION - ONSET: ONSET: ON-GOING

## 2024-12-15 ASSESSMENT — PAIN DESCRIPTION - LOCATION: LOCATION: SHOULDER

## 2024-12-15 NOTE — ED PROVIDER NOTES
history.      CURRENT MEDICATIONS       Discharge Medication List as of 12/15/2024 10:51 AM        CONTINUE these medications which have NOT CHANGED    Details   escitalopram (LEXAPRO) 10 MG tablet Take by mouth dailyHistorical Med      medroxyPROGESTERone (DEPO-PROVERA) 150 MG/ML injection Inject into the muscle onceHistorical Med             ALLERGIES     Iodine    FAMILY HISTORY     History reviewed. No pertinent family history.       SOCIAL HISTORY       Social History     Socioeconomic History    Marital status: Single     Spouse name: None    Number of children: None    Years of education: None    Highest education level: None   Tobacco Use    Smoking status: Former     Current packs/day: 0.00     Types: Cigarettes     Quit date: 8/6/2021     Years since quitting: 3.3    Smokeless tobacco: Never   Substance and Sexual Activity    Alcohol use: No    Drug use: Not Currently     Types: Marijuana (Weed)           PHYSICAL EXAM    (up to 7 for level 4, 8 or more for level 5)     ED Triage Vitals [12/15/24 0925]   BP Systolic BP Percentile Diastolic BP Percentile Temp Temp Source Pulse Respirations SpO2   (!) 128/90 -- -- 97.1 °F (36.2 °C) Temporal (!) 115 15 96 %      Height Weight - Scale         1.626 m (5' 4\") 81.2 kg (179 lb 0.2 oz)             Body mass index is 30.73 kg/m².    Physical Exam  Vitals and nursing note reviewed.   Constitutional:       General: She is not in acute distress.     Appearance: Normal appearance. She is normal weight. She is not ill-appearing, toxic-appearing or diaphoretic.      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      Right Ear: Tympanic membrane normal.      Left Ear: Tympanic membrane normal.   Cardiovascular:      Rate and Rhythm: Normal rate and regular rhythm.   Pulmonary:      Effort: Pulmonary effort is normal.      Breath sounds: Normal breath sounds.   Abdominal:      General: Bowel sounds are normal.

## 2025-07-21 ENCOUNTER — OFFICE VISIT (OUTPATIENT)
Age: 45
End: 2025-07-21

## 2025-07-21 ENCOUNTER — TRANSCRIBE ORDERS (OUTPATIENT)
Facility: HOSPITAL | Age: 45
End: 2025-07-21

## 2025-07-21 VITALS
TEMPERATURE: 98.1 F | OXYGEN SATURATION: 98 % | RESPIRATION RATE: 16 BRPM | WEIGHT: 181 LBS | SYSTOLIC BLOOD PRESSURE: 141 MMHG | BODY MASS INDEX: 31.07 KG/M2 | HEART RATE: 93 BPM | DIASTOLIC BLOOD PRESSURE: 90 MMHG

## 2025-07-21 DIAGNOSIS — Z91.89 OTHER SPECIFIED PERSONAL RISK FACTORS, NOT ELSEWHERE CLASSIFIED: Primary | ICD-10-CM

## 2025-07-21 DIAGNOSIS — R39.9 URINARY TRACT INFECTION SYMPTOMS: ICD-10-CM

## 2025-07-21 DIAGNOSIS — R03.0 ELEVATED BLOOD PRESSURE READING: ICD-10-CM

## 2025-07-21 DIAGNOSIS — L03.312 CELLULITIS OF BACK: ICD-10-CM

## 2025-07-21 DIAGNOSIS — B02.9 HERPES ZOSTER WITHOUT COMPLICATION: Primary | ICD-10-CM

## 2025-07-21 LAB
BILIRUBIN, URINE, POC: ABNORMAL
BLOOD URINE, POC: ABNORMAL
GLUCOSE URINE, POC: ABNORMAL
KETONES, URINE, POC: ABNORMAL
LEUKOCYTE ESTERASE, URINE, POC: NEGATIVE
NITRITE, URINE, POC: NEGATIVE
PH, URINE, POC: 5.5 (ref 4.6–8)
PROTEIN,URINE, POC: ABNORMAL
SPECIFIC GRAVITY, URINE, POC: 1.02 (ref 1–1.03)
URINALYSIS CLARITY, POC: CLEAR
URINALYSIS COLOR, POC: YELLOW
UROBILINOGEN, POC: NORMAL MG/DL

## 2025-07-21 RX ORDER — CEPHALEXIN 500 MG/1
500 CAPSULE ORAL 4 TIMES DAILY
Qty: 28 CAPSULE | Refills: 0 | Status: SHIPPED | OUTPATIENT
Start: 2025-07-21 | End: 2025-07-28

## 2025-07-21 RX ORDER — DULOXETIN HYDROCHLORIDE 20 MG/1
20 CAPSULE, DELAYED RELEASE ORAL DAILY
COMMUNITY

## 2025-07-21 RX ORDER — DEXTROAMPHETAMINE SACCHARATE, AMPHETAMINE ASPARTATE MONOHYDRATE, DEXTROAMPHETAMINE SULFATE AND AMPHETAMINE SULFATE 3.75; 3.75; 3.75; 3.75 MG/1; MG/1; MG/1; MG/1
15 CAPSULE, EXTENDED RELEASE ORAL 2 TIMES DAILY
COMMUNITY

## 2025-07-21 RX ORDER — ACYCLOVIR 50 MG/G
OINTMENT TOPICAL
Qty: 15 G | Refills: 0 | Status: SHIPPED | OUTPATIENT
Start: 2025-07-21 | End: 2025-07-28

## 2025-07-21 RX ORDER — BUSPIRONE HYDROCHLORIDE 15 MG/1
15 TABLET ORAL 2 TIMES DAILY
COMMUNITY

## 2025-07-21 ASSESSMENT — ENCOUNTER SYMPTOMS
EYES NEGATIVE: 1
RESPIRATORY NEGATIVE: 1
GASTROINTESTINAL NEGATIVE: 1
ALLERGIC/IMMUNOLOGIC NEGATIVE: 1

## 2025-07-21 NOTE — PATIENT INSTRUCTIONS
Thank you for visiting [facility] today.    -Calamine lotion 4-5 times daily as needed  -Zinc oxide cream  -Ibuprofen/Tylenol    If you have shingles,  you may wonder when it is safe to return to work.  The answer depends on where you work and where your rash is located:  If the rash is on your face, do not return to work until the area has crusted over, which generally takes 7 to 10 days  If the rash is in an area that you can cover (eg, with gauze bandage or clothing), you may return to work once you feel well  If you work in a health care facility (eg, hospital, medical office, nursing home), consult your health care provider about when it is safe to return to work  Avoid exposure to pregnant women    Follow up with your PCP for continuity of care and assessment of improvement.      Go to the ER if:  The rash is on your face or nose  You have facial pain, pain around your eye area, or loss of feeling on one side of your face  You have difficulty seeing  You have ear pain or have ringing in your ear  You have a loss of taste  Your condition gets worse    A survey will be sent shortly to your phone/email.  Please complete this so we may know how to better serve you in the future.

## 2025-07-21 NOTE — PROGRESS NOTES
2025   Anjali Marques (: 1980) is a 45 y.o. female, New patient, here for evaluation of the following chief complaint(s):  Herpes Zoster (Shingles flare up x fri. Started valtrex on fri night. This is 5th time on valtrex. Spreading to face and back. )       Below is the assessment and plan developed based on review of pertinent history, physical exam, labs, studies, and medications.     Assessment & Plan  Herpes zoster without complication   Patient with a vesicular rash on an erythematous base in a dermatomal pattern consistent with herpes zoster of the right upper back.  Patiently currently on Valtrex 1 g, 3 times a day prescribed by OB/GYN.  Patient reports she was homeless for a long time with no diagnosis of immunocompromise but she believes her immune system is weak as she reports continued outbreaks of shingles since December.  Patient sent acyclovir topical ointment to apply.  Low suspicion for alternate etiology of rash such as SJS, drug rash, viral exanthem, or other emergent cause of rash.  Potential rash could be due to other causes, send referral to Derm for patient to follow-up for further evaluation and management.  Patient sent referral to ophthalmology and instructed to follow-up if rash spreads to nose or close to eyes.  Patient verbalizes agreement and understanding with plan of care    Orders:    acyclovir (ZOVIRAX) 5 % ointment; Apply topically 5 times daily.    Amb External Referral To Ophthalmology    [facility] - Radha Rapp MD, Dermatology, [place]    Cellulitis of back  Patient with no signs of infection in clinic.  Patient reports purulent discharge at home.  Keflex sent for patient to take 4 times a day for 7 days.    Orders:    cephALEXin (KEFLEX) 500 MG capsule; Take 1 capsule by mouth 4 times daily for 7 days    Urinary tract infection symptoms  Negative for leukocyte esterase and nitrite.  Trace blood noted.  Do not suspect UTI at this time.    Orders:    AMB POC

## 2025-08-06 ENCOUNTER — OFFICE VISIT (OUTPATIENT)
Age: 45
End: 2025-08-06

## 2025-08-06 VITALS
DIASTOLIC BLOOD PRESSURE: 82 MMHG | BODY MASS INDEX: 31.07 KG/M2 | WEIGHT: 181 LBS | SYSTOLIC BLOOD PRESSURE: 116 MMHG | OXYGEN SATURATION: 98 % | RESPIRATION RATE: 17 BRPM | TEMPERATURE: 98 F | HEART RATE: 117 BPM

## 2025-08-06 DIAGNOSIS — R21 RASH AND NONSPECIFIC SKIN ERUPTION: Primary | ICD-10-CM

## 2025-08-06 RX ORDER — TRIAMCINOLONE ACETONIDE 0.25 MG/G
CREAM TOPICAL
Qty: 15 G | Refills: 0 | Status: SHIPPED | OUTPATIENT
Start: 2025-08-06 | End: 2025-08-13

## 2025-08-06 RX ORDER — ACYCLOVIR 50 MG/G
OINTMENT TOPICAL
Qty: 30 G | Refills: 0 | Status: SHIPPED | OUTPATIENT
Start: 2025-08-06 | End: 2025-08-13

## 2025-08-28 ENCOUNTER — HOSPITAL ENCOUNTER (EMERGENCY)
Facility: HOSPITAL | Age: 45
Discharge: LEFT BEFORE DISCHARGE | End: 2025-08-28
Attending: STUDENT IN AN ORGANIZED HEALTH CARE EDUCATION/TRAINING PROGRAM
Payer: MEDICAID

## 2025-08-28 VITALS
BODY MASS INDEX: 30.71 KG/M2 | OXYGEN SATURATION: 100 % | DIASTOLIC BLOOD PRESSURE: 97 MMHG | HEIGHT: 64 IN | WEIGHT: 179.9 LBS | SYSTOLIC BLOOD PRESSURE: 141 MMHG | TEMPERATURE: 98.4 F | HEART RATE: 123 BPM | RESPIRATION RATE: 20 BRPM

## 2025-08-28 DIAGNOSIS — R21 RASH AND OTHER NONSPECIFIC SKIN ERUPTION: Primary | ICD-10-CM

## 2025-08-28 LAB
ALBUMIN SERPL-MCNC: 4.1 G/DL (ref 3.5–5.2)
ALBUMIN/GLOB SERPL: 1.2 (ref 1.1–2.2)
ALP SERPL-CCNC: 30 U/L (ref 35–104)
ALT SERPL-CCNC: 13 U/L (ref 10–35)
ANION GAP SERPL CALC-SCNC: 13 MMOL/L (ref 2–14)
AST SERPL-CCNC: 18 U/L (ref 10–35)
BASOPHILS # BLD: 0.07 K/UL (ref 0–0.1)
BASOPHILS NFR BLD: 0.7 % (ref 0–1)
BILIRUB SERPL-MCNC: 0.4 MG/DL (ref 0–1.2)
BUN SERPL-MCNC: 11 MG/DL (ref 6–20)
CALCIUM SERPL-MCNC: 9.9 MG/DL (ref 8.6–10)
CHLORIDE SERPL-SCNC: 105 MMOL/L (ref 98–107)
CO2 SERPL-SCNC: 19 MMOL/L (ref 20–29)
COMMENT:: NORMAL
CREAT SERPL-MCNC: 0.59 MG/DL (ref 0.6–1)
DIFFERENTIAL METHOD BLD: ABNORMAL
EKG ATRIAL RATE: 93 BPM
EKG DIAGNOSIS: NORMAL
EKG P AXIS: 51 DEGREES
EKG P-R INTERVAL: 122 MS
EKG Q-T INTERVAL: 340 MS
EKG QRS DURATION: 78 MS
EKG QTC CALCULATION (BAZETT): 422 MS
EKG R AXIS: 57 DEGREES
EKG T AXIS: 63 DEGREES
EKG VENTRICULAR RATE: 93 BPM
EOSINOPHIL # BLD: 0.24 K/UL (ref 0–0.4)
EOSINOPHIL NFR BLD: 2.2 % (ref 0–7)
ERYTHROCYTE [DISTWIDTH] IN BLOOD BY AUTOMATED COUNT: 13.1 % (ref 11.5–14.5)
GLOBULIN SER CALC-MCNC: 3.4 G/DL (ref 2–4)
GLUCOSE SERPL-MCNC: 89 MG/DL (ref 65–100)
HCT VFR BLD AUTO: 42.3 % (ref 35–47)
HGB BLD-MCNC: 14.6 G/DL (ref 11.5–16)
IMM GRANULOCYTES # BLD AUTO: 0.03 K/UL (ref 0–0.04)
IMM GRANULOCYTES NFR BLD AUTO: 0.3 % (ref 0–0.5)
LYMPHOCYTES # BLD: 4.21 K/UL (ref 0.8–3.5)
LYMPHOCYTES NFR BLD: 39.2 % (ref 12–49)
MAGNESIUM SERPL-MCNC: 2.2 MG/DL (ref 1.6–2.6)
MCH RBC QN AUTO: 30.1 PG (ref 26–34)
MCHC RBC AUTO-ENTMCNC: 34.5 G/DL (ref 30–36.5)
MCV RBC AUTO: 87.2 FL (ref 80–99)
MONOCYTES # BLD: 0.77 K/UL (ref 0–1)
MONOCYTES NFR BLD: 7.2 % (ref 5–13)
NEUTS SEG # BLD: 5.42 K/UL (ref 1.8–8)
NEUTS SEG NFR BLD: 50.4 % (ref 32–75)
NRBC # BLD: 0 K/UL (ref 0–0.01)
NRBC BLD-RTO: 0 PER 100 WBC
PLATELET # BLD AUTO: 350 K/UL (ref 150–400)
PMV BLD AUTO: 9.4 FL (ref 8.9–12.9)
POTASSIUM SERPL-SCNC: 3.8 MMOL/L (ref 3.5–5.1)
PROT SERPL-MCNC: 7.5 G/DL (ref 6.4–8.3)
RBC # BLD AUTO: 4.85 M/UL (ref 3.8–5.2)
SODIUM SERPL-SCNC: 136 MMOL/L (ref 136–145)
SPECIMEN HOLD: NORMAL
WBC # BLD AUTO: 10.7 K/UL (ref 3.6–11)

## 2025-08-28 PROCEDURE — 93005 ELECTROCARDIOGRAM TRACING: CPT | Performed by: PHYSICIAN ASSISTANT

## 2025-08-28 PROCEDURE — 2580000003 HC RX 258: Performed by: PHYSICIAN ASSISTANT

## 2025-08-28 PROCEDURE — 99284 EMERGENCY DEPT VISIT MOD MDM: CPT

## 2025-08-28 PROCEDURE — 83735 ASSAY OF MAGNESIUM: CPT

## 2025-08-28 PROCEDURE — 96360 HYDRATION IV INFUSION INIT: CPT

## 2025-08-28 PROCEDURE — 80053 COMPREHEN METABOLIC PANEL: CPT

## 2025-08-28 PROCEDURE — 85025 COMPLETE CBC W/AUTO DIFF WBC: CPT

## 2025-08-28 RX ORDER — 0.9 % SODIUM CHLORIDE 0.9 %
1000 INTRAVENOUS SOLUTION INTRAVENOUS ONCE
Status: COMPLETED | OUTPATIENT
Start: 2025-08-28 | End: 2025-08-28

## 2025-08-28 RX ORDER — IBUPROFEN 400 MG/1
600 TABLET, FILM COATED ORAL
Status: DISCONTINUED | OUTPATIENT
Start: 2025-08-28 | End: 2025-08-28 | Stop reason: HOSPADM

## 2025-08-28 RX ORDER — NYSTATIN 100000 U/G
CREAM TOPICAL
Qty: 15 G | Refills: 0 | Status: SHIPPED | OUTPATIENT
Start: 2025-08-28

## 2025-08-28 RX ORDER — ACETAMINOPHEN 500 MG
1000 TABLET ORAL
Status: DISCONTINUED | OUTPATIENT
Start: 2025-08-28 | End: 2025-08-28 | Stop reason: HOSPADM

## 2025-08-28 RX ORDER — LIDOCAINE 40 MG/G
CREAM TOPICAL PRN
Status: DISCONTINUED | OUTPATIENT
Start: 2025-08-28 | End: 2025-08-28 | Stop reason: HOSPADM

## 2025-08-28 RX ORDER — MUPIROCIN 2 %
OINTMENT (GRAM) TOPICAL
Qty: 15 G | Refills: 0 | Status: SHIPPED | OUTPATIENT
Start: 2025-08-28 | End: 2025-09-04

## 2025-08-28 RX ORDER — HYDROCORTISONE 25 MG/G
CREAM TOPICAL
Qty: 3.5 G | Refills: 0 | Status: SHIPPED | OUTPATIENT
Start: 2025-08-28

## 2025-08-28 RX ADMIN — SODIUM CHLORIDE 1000 ML: 0.9 INJECTION, SOLUTION INTRAVENOUS at 14:00

## 2025-08-28 ASSESSMENT — PAIN DESCRIPTION - FREQUENCY: FREQUENCY: CONTINUOUS

## 2025-08-28 ASSESSMENT — PAIN - FUNCTIONAL ASSESSMENT: PAIN_FUNCTIONAL_ASSESSMENT: PREVENTS OR INTERFERES WITH ALL ACTIVE AND SOME PASSIVE ACTIVITIES

## 2025-08-28 ASSESSMENT — PAIN DESCRIPTION - PAIN TYPE: TYPE: CHRONIC PAIN

## 2025-08-28 ASSESSMENT — PAIN SCALES - GENERAL: PAINLEVEL_OUTOF10: 10

## 2025-08-28 ASSESSMENT — PAIN DESCRIPTION - ORIENTATION: ORIENTATION: OTHER (COMMENT)

## 2025-08-28 ASSESSMENT — PAIN DESCRIPTION - LOCATION: LOCATION: GENERALIZED

## 2025-08-28 ASSESSMENT — PAIN DESCRIPTION - ONSET: ONSET: ON-GOING

## 2025-08-28 ASSESSMENT — PAIN DESCRIPTION - DESCRIPTORS: DESCRIPTORS: BURNING
